# Patient Record
Sex: FEMALE | Race: WHITE | NOT HISPANIC OR LATINO | Employment: FULL TIME | URBAN - METROPOLITAN AREA
[De-identification: names, ages, dates, MRNs, and addresses within clinical notes are randomized per-mention and may not be internally consistent; named-entity substitution may affect disease eponyms.]

---

## 2023-10-30 ENCOUNTER — EVALUATION (OUTPATIENT)
Dept: PHYSICAL THERAPY | Facility: CLINIC | Age: 48
End: 2023-10-30
Payer: COMMERCIAL

## 2023-10-30 DIAGNOSIS — M54.50 CHRONIC BILATERAL LOW BACK PAIN WITHOUT SCIATICA: Primary | ICD-10-CM

## 2023-10-30 DIAGNOSIS — M54.50 LOW BACK PAIN, UNSPECIFIED: ICD-10-CM

## 2023-10-30 DIAGNOSIS — G89.29 CHRONIC BILATERAL LOW BACK PAIN WITHOUT SCIATICA: Primary | ICD-10-CM

## 2023-10-30 PROCEDURE — 97530 THERAPEUTIC ACTIVITIES: CPT | Mod: GP | Performed by: PHYSICAL THERAPIST

## 2023-10-30 PROCEDURE — 97162 PT EVAL MOD COMPLEX 30 MIN: CPT | Mod: GP | Performed by: PHYSICAL THERAPIST

## 2023-10-30 PROCEDURE — 97110 THERAPEUTIC EXERCISES: CPT | Mod: GP | Performed by: PHYSICAL THERAPIST

## 2023-10-30 ASSESSMENT — ENCOUNTER SYMPTOMS
OCCASIONAL FEELINGS OF UNSTEADINESS: 0
DEPRESSION: 0
LOSS OF SENSATION IN FEET: 0

## 2023-10-30 NOTE — PROGRESS NOTES
Physical Therapy Evaluation and Treatment      Patient Name: Shanti Gonsales  MRN: [id number]  Today's Date: 10/30/2023  Time Calculation  Start Time: 0730  Stop Time: 0809  Time Calculation (min): 39 min  PT Therapeutic Procedures Time Entry  Therapeutic Exercise Time Entry: 14  Therapeutic Activity Time Entry: 10    Current Problem:   1. Chronic bilateral low back pain without sciatica        2. Low back pain, unspecified  Referral to Physical Therapy    Follow Up In Physical Therapy          Subjective    General:  Pt reports she is having a lot of lower back pain. It starts in center and spreads out to both sides. She finds it is really bad when standing for long periods. Pain is even bothering her right hip now, trying to prop hip. No specific injury, no imaging. Been bothering her for over 6 months. Uncomfortable trying to sleep at night. When pain gets really bad during walking and standing she finds she has to lean over and stretch out her back to relieve pain. Job is difficult as she is constantly on her feet.       Precautions: None  Pain: 10/10    Objective   ROM   Lumbar: Flexion 50%, Extension 25%, Right rotation 50%, Left rotation 50%, Right side bending 50%, Left side bending 50%    Pain with extension, bilat SB    MMT   Bilateral LE strength:  Hip flex 4-/5  Knee flex 4/5  Knee ext 4/5  Pain with all right strength test    Dermatomes/Myotomes/Reflexes  Denies numbness/tingling    Palpation  TTP lumbar vertebrae and paraspinals.   TTP bilat PSIS and right glutes.     Flexibility  Bilateral hip flexor and hamstring tightness     Special Tests   Lumbar: Other: Slump: Negative       Gait   Ambulates with decreased mathew and slight forward lean posture. Bilat LE in ER with decreased knee flexion.     Stairs   Ascend/descend stairs with step-to-pattern and 1 rail    Outcome Measures:  Low Back Disability / Oswestry: 22/50    Treatments:  Therapeutic Exercise:  SKTC  PPT  LTR  SLR    Therapeutic Activity:    Educated pt on eval findings and POC  Educated pt on anatomy of spine  Advised to avoid heavy lifting/bending/twist of spine.     Assessment   Pt is a 48 y.o. female with chronic LBP suggestive of stenosis. Pt with decreased ROM, reduced strength, gait deficits, flexibility limitations and abnormal posture. Pt will benefit from skilled PT to address the above deficits for improvement in functional activities.       Moderate complexity due to patient's clinical presentation being evolving with changing characteristics, with comorbidities to include arthritis , all of which may negatively impact rehab tolerance and progression.     Plan:   Treatment/Interventions: Dry needling, Education/ Instruction, Gait training, Manual therapy, Neuromuscular re-education, Self care/ home management, Therapeutic activities, Therapeutic exercises  PT Plan: Skilled PT  PT Frequency: 1 time per week  Duration: 5 more visits  Rehab Potential: Good  Plan of Care Agreement: Patient        Goals:   Active       Mobility       Goal 1       Start:  10/30/23    Expected End:  01/28/24       Pt will improve lumbar AROM to WNL to improve I/ADLs.         Goal 2       Start:  10/30/23    Expected End:  01/28/24       Pt will improve bilat LE strength to 5/5 to improve I/ADLs.            Pain       Goal 1       Start:  10/30/23    Expected End:  01/28/24       Pt will perform all work activities with <3/10 pain.         Goal 2       Start:  10/30/23    Expected End:  01/28/24       Pt will stand/walk >30 min with <3/10 pain.

## 2023-10-30 NOTE — Clinical Note
October 30, 2023     Patient: Shanti Gonsales   YOB: 1975   Date of Visit: 10/30/2023       To Whom it May Concern:    Shanti Gonsales was seen in my clinic on 10/30/2023. She {Return to school/sport:03399}.    If you have any questions or concerns, please don't hesitate to call.         Sincerely,          Mireya Love, PT        CC: No Recipients

## 2023-10-30 NOTE — Clinical Note
October 30, 2023     Patient: Shanti Gonsales   YOB: 1975   Date of Visit: 10/30/2023       To Whom It May Concern:    It is my medical opinion that Shanti Gonsales {Work release (duty restriction):43617}.    If you have any questions or concerns, please don't hesitate to call.         Sincerely,        Mireya Love, PT    CC: No Recipients

## 2023-11-01 DIAGNOSIS — Z76.0 MEDICATION REFILL: ICD-10-CM

## 2023-11-01 RX ORDER — LEVOTHYROXINE SODIUM 100 UG/1
100 TABLET ORAL
Qty: 90 TABLET | Refills: 2 | Status: SHIPPED | OUTPATIENT
Start: 2023-11-01 | End: 2024-07-28

## 2023-11-01 RX ORDER — OMEPRAZOLE 20 MG/1
20 CAPSULE, DELAYED RELEASE ORAL DAILY
Qty: 90 CAPSULE | Refills: 2 | Status: SHIPPED | OUTPATIENT
Start: 2023-11-01 | End: 2024-07-28

## 2023-11-08 ENCOUNTER — TREATMENT (OUTPATIENT)
Dept: PHYSICAL THERAPY | Facility: CLINIC | Age: 48
End: 2023-11-08
Payer: COMMERCIAL

## 2023-11-08 DIAGNOSIS — M54.50 LOW BACK PAIN, UNSPECIFIED: ICD-10-CM

## 2023-11-08 DIAGNOSIS — G89.29 CHRONIC MIDLINE LOW BACK PAIN WITHOUT SCIATICA: Primary | ICD-10-CM

## 2023-11-08 DIAGNOSIS — M54.50 CHRONIC MIDLINE LOW BACK PAIN WITHOUT SCIATICA: Primary | ICD-10-CM

## 2023-11-08 PROCEDURE — 97110 THERAPEUTIC EXERCISES: CPT | Mod: GP | Performed by: PHYSICAL THERAPIST

## 2023-11-08 NOTE — PROGRESS NOTES
Physical Therapy Treatment    Patient Name: Shanti Gonsales  MRN: [id number]  Today's Date: 11/8/2023  Time Calculation  Start Time: 1400  Stop Time: 1439  Time Calculation (min): 39 min  PT Therapeutic Procedures Time Entry  Therapeutic Exercise Time Entry: 39  Visit # 2/6    Current Problem   1. Chronic midline low back pain without sciatica        2. Low back pain, unspecified  Follow Up In Physical Therapy          Subjective   General   Pt reports her right low back and hip is really bothering her. Able to do all exercises, but noticed popping on right with SLR. Walked at [facility] yesterday, but with pain.    Precautions: None  Pain 5/10  Post Treatment Pain Level 5/10    Objective   Bilateral anterior pelvic tilt in standing     Treatments:  NuStep L5 x6 minutes  Gastroc stretch at wedge, 30 seconds x 3  Stand hamstring stretch, 30 seconds x 3 ea R/L   Flexion stretch at step, 30 seconds x 3   Stand hip ABD, 2x10 ea R/L   Seated MIP, 2x10   LTR, 2x10  SLR, 2x10 ea R/L  Bent leg fall out, 2x10 ea R/L  Bridge 2x10   H/L hip ADD, 2x10      Assessment   Pt with good tolerance to there ex progressions without increases in pain/symptoms. Biggest relief of symptoms involves lumbar flexion based motions.       Plan: Strengthening    OP EDUCATION: Posture    Goals:   Active       Mobility       Goal 1       Start:  10/30/23    Expected End:  01/28/24       Pt will improve lumbar AROM to WNL to improve I/ADLs.         Goal 2       Start:  10/30/23    Expected End:  01/28/24       Pt will improve bilat LE strength to 5/5 to improve I/ADLs.            Pain       Goal 1       Start:  10/30/23    Expected End:  01/28/24       Pt will perform all work activities with <3/10 pain.         Goal 2       Start:  10/30/23    Expected End:  01/28/24       Pt will stand/walk >30 min with <3/10 pain.

## 2023-11-22 ENCOUNTER — APPOINTMENT (OUTPATIENT)
Dept: PHYSICAL THERAPY | Facility: CLINIC | Age: 48
End: 2023-11-22
Payer: COMMERCIAL

## 2023-11-29 ENCOUNTER — DOCUMENTATION (OUTPATIENT)
Dept: PHYSICAL THERAPY | Facility: CLINIC | Age: 48
End: 2023-11-29
Payer: COMMERCIAL

## 2023-11-29 NOTE — PROGRESS NOTES
Physical Therapy                 Therapy Communication Note    Patient Name: Shanti Gonsales  MRN: [id number]  Today's Date: 11/29/2023     Discipline: Physical Therapy    Missed Visit Reason:      Missed Time: No Show    Comment:

## 2023-12-06 ENCOUNTER — DOCUMENTATION (OUTPATIENT)
Dept: PHYSICAL THERAPY | Facility: CLINIC | Age: 48
End: 2023-12-06
Payer: COMMERCIAL

## 2023-12-06 NOTE — PROGRESS NOTES
Physical Therapy                 Therapy Communication Note    Patient Name: Shanti Gonsales  MRN: [id number]  Today's Date: 12/6/2023     Discipline: Physical Therapy    Missed Visit Reason:      Missed Time: No Show    Comment:

## 2024-01-05 ENCOUNTER — TELEPHONE (OUTPATIENT)
Dept: PRIMARY CARE | Facility: CLINIC | Age: 49
End: 2024-01-05
Payer: COMMERCIAL

## 2024-01-05 DIAGNOSIS — U07.1 COVID-19: Primary | ICD-10-CM

## 2024-01-05 NOTE — TELEPHONE ENCOUNTER
Patient called the office because she tested pos for covid 1/5 - her symptoms started 1/2. Her symptoms are congestion, fatigue, cough, abdominal pain. She is asking if paxlovid can be sent in for her to lessen her symptoms? Please advise. Call back [phone].

## 2024-01-23 ENCOUNTER — DOCUMENTATION (OUTPATIENT)
Dept: PHYSICAL THERAPY | Facility: CLINIC | Age: 49
End: 2024-01-23
Payer: COMMERCIAL

## 2024-01-23 NOTE — PROGRESS NOTES
Physical Therapy    Discharge Summary    Name: Shanti Gonsales  MRN: [id number]  : 1975  Date: 24    Discharge Summary: PT    Discharge Information: Date of last visit 2023, Date of evaluation 10/30/2023, and Number of attended visits 2    Therapy Summary: Pt only attended one follow-up visit after initial eval. She is able to complete activities at the [facility] without symptoms.    Discharge Status: Return to MD as needed.     Rehab Discharge Reason: Failed to schedule and/or keep follow-up appointment(s)

## 2024-02-09 ENCOUNTER — OFFICE VISIT (OUTPATIENT)
Dept: PRIMARY CARE | Facility: CLINIC | Age: 49
End: 2024-02-09
Payer: COMMERCIAL

## 2024-02-09 ENCOUNTER — LAB (OUTPATIENT)
Dept: LAB | Facility: LAB | Age: 49
End: 2024-02-09
Payer: COMMERCIAL

## 2024-02-09 VITALS
DIASTOLIC BLOOD PRESSURE: 80 MMHG | SYSTOLIC BLOOD PRESSURE: 128 MMHG | OXYGEN SATURATION: 98 % | BODY MASS INDEX: 42.7 KG/M2 | WEIGHT: 226 LBS | HEART RATE: 78 BPM | TEMPERATURE: 97.9 F

## 2024-02-09 DIAGNOSIS — E03.9 HYPOTHYROIDISM, UNSPECIFIED TYPE: ICD-10-CM

## 2024-02-09 DIAGNOSIS — Z12.31 BREAST CANCER SCREENING BY MAMMOGRAM: ICD-10-CM

## 2024-02-09 DIAGNOSIS — Z00.00 ADULT GENERAL MEDICAL EXAM: Primary | ICD-10-CM

## 2024-02-09 DIAGNOSIS — Z00.00 ADULT GENERAL MEDICAL EXAM: ICD-10-CM

## 2024-02-09 DIAGNOSIS — R31.29 MICROSCOPIC HEMATURIA: Primary | ICD-10-CM

## 2024-02-09 DIAGNOSIS — Z12.11 COLON CANCER SCREENING: ICD-10-CM

## 2024-02-09 LAB
ALBUMIN SERPL-MCNC: 4.3 G/DL (ref 3.5–5)
ALP BLD-CCNC: 86 U/L (ref 35–125)
ALT SERPL-CCNC: 19 U/L (ref 5–40)
ANION GAP SERPL CALC-SCNC: 12 MMOL/L
APPEARANCE UR: ABNORMAL
AST SERPL-CCNC: 17 U/L (ref 5–40)
BACTERIA #/AREA URNS AUTO: ABNORMAL /HPF
BILIRUB SERPL-MCNC: 0.4 MG/DL (ref 0.1–1.2)
BILIRUB UR STRIP.AUTO-MCNC: NEGATIVE MG/DL
BUN SERPL-MCNC: 20 MG/DL (ref 8–25)
CALCIUM SERPL-MCNC: 9.5 MG/DL (ref 8.5–10.4)
CHLORIDE SERPL-SCNC: 102 MMOL/L (ref 97–107)
CHOLEST SERPL-MCNC: 218 MG/DL (ref 133–200)
CHOLEST/HDLC SERPL: 5.6 {RATIO}
CO2 SERPL-SCNC: 26 MMOL/L (ref 24–31)
COLOR UR: ABNORMAL
CREAT SERPL-MCNC: 0.9 MG/DL (ref 0.4–1.6)
EGFRCR SERPLBLD CKD-EPI 2021: 78 ML/MIN/1.73M*2
ERYTHROCYTE [DISTWIDTH] IN BLOOD BY AUTOMATED COUNT: 14.3 % (ref 11.5–14.5)
EST. AVERAGE GLUCOSE BLD GHB EST-MCNC: 137 MG/DL
GLUCOSE SERPL-MCNC: 99 MG/DL (ref 65–99)
GLUCOSE UR STRIP.AUTO-MCNC: NORMAL MG/DL
HBA1C MFR BLD: 6.4 %
HCT VFR BLD AUTO: 41.6 % (ref 36–46)
HDLC SERPL-MCNC: 39 MG/DL
HGB BLD-MCNC: 13.3 G/DL (ref 12–16)
HOLD SPECIMEN: NORMAL
KETONES UR STRIP.AUTO-MCNC: NEGATIVE MG/DL
LDLC SERPL CALC-MCNC: 120 MG/DL (ref 65–130)
LEUKOCYTE ESTERASE UR QL STRIP.AUTO: ABNORMAL
MCH RBC QN AUTO: 26.2 PG (ref 26–34)
MCHC RBC AUTO-ENTMCNC: 32 G/DL (ref 32–36)
MCV RBC AUTO: 82 FL (ref 80–100)
MUCOUS THREADS #/AREA URNS AUTO: ABNORMAL /LPF
NITRITE UR QL STRIP.AUTO: NEGATIVE
NRBC BLD-RTO: 0 /100 WBCS (ref 0–0)
PH UR STRIP.AUTO: 5 [PH]
PLATELET # BLD AUTO: 335 X10*3/UL (ref 150–450)
POTASSIUM SERPL-SCNC: 4.7 MMOL/L (ref 3.4–5.1)
PROT SERPL-MCNC: 7.2 G/DL (ref 5.9–7.9)
PROT UR STRIP.AUTO-MCNC: ABNORMAL MG/DL
RBC # BLD AUTO: 5.08 X10*6/UL (ref 4–5.2)
RBC # UR STRIP.AUTO: ABNORMAL /UL
RBC #/AREA URNS AUTO: ABNORMAL /HPF
SODIUM SERPL-SCNC: 140 MMOL/L (ref 133–145)
SP GR UR STRIP.AUTO: 1.02
SQUAMOUS #/AREA URNS AUTO: ABNORMAL /HPF
TRANS CELLS #/AREA UR COMP ASSIST: ABNORMAL /HPF
TRIGL SERPL-MCNC: 293 MG/DL (ref 40–150)
TSH SERPL DL<=0.05 MIU/L-ACNC: 2.63 MIU/L (ref 0.27–4.2)
UROBILINOGEN UR STRIP.AUTO-MCNC: NORMAL MG/DL
WBC # BLD AUTO: 10.5 X10*3/UL (ref 4.4–11.3)
WBC #/AREA URNS AUTO: ABNORMAL /HPF

## 2024-02-09 PROCEDURE — 85027 COMPLETE CBC AUTOMATED: CPT

## 2024-02-09 PROCEDURE — 83036 HEMOGLOBIN GLYCOSYLATED A1C: CPT

## 2024-02-09 PROCEDURE — 90750 HZV VACC RECOMBINANT IM: CPT | Performed by: INTERNAL MEDICINE

## 2024-02-09 PROCEDURE — 80053 COMPREHEN METABOLIC PANEL: CPT

## 2024-02-09 PROCEDURE — 1036F TOBACCO NON-USER: CPT | Performed by: INTERNAL MEDICINE

## 2024-02-09 PROCEDURE — 84443 ASSAY THYROID STIM HORMONE: CPT

## 2024-02-09 PROCEDURE — 81001 URINALYSIS AUTO W/SCOPE: CPT

## 2024-02-09 PROCEDURE — 99396 PREV VISIT EST AGE 40-64: CPT | Performed by: INTERNAL MEDICINE

## 2024-02-09 PROCEDURE — 36415 COLL VENOUS BLD VENIPUNCTURE: CPT

## 2024-02-09 PROCEDURE — 87086 URINE CULTURE/COLONY COUNT: CPT

## 2024-02-09 PROCEDURE — 80061 LIPID PANEL: CPT

## 2024-02-09 RX ORDER — METOPROLOL TARTRATE 25 MG/1
12.5 TABLET, FILM COATED ORAL 2 TIMES DAILY
COMMUNITY
End: 2024-03-11

## 2024-02-09 RX ORDER — SERTRALINE HYDROCHLORIDE 50 MG/1
50 TABLET, FILM COATED ORAL
COMMUNITY
Start: 2019-08-01

## 2024-02-09 ASSESSMENT — PAIN SCALES - GENERAL: PAINLEVEL: 0-NO PAIN

## 2024-02-09 NOTE — PROGRESS NOTES
Subjective   Patient ID: Shanti Gonsales is a 48 y.o. female who presents for Annual Exam.    HPI  1.  Physical exam.  Pap: last done, scheduled with GYN, Dr. Chaidez  Mammogram: last done, overdue   Colonoscopy: last done, 2014       Review of Systems  All pertinent positive symptoms are included in the history of present illness.    All other systems have been reviewed and are negative and noncontributory to this patient's current ailments.    Past Medical History:   Diagnosis Date    Disorder of thyroid, unspecified     Thyroid trouble    Other conditions influencing health status     Right handed    Personal history of other diseases of the circulatory system     History of hypertension    Personal history of other specified conditions     History of shortness of breath    Unspecified disorder of nose and nasal sinuses     Sinus trouble     History reviewed. No pertinent surgical history.  Social History     Tobacco Use    Smoking status: Never    Smokeless tobacco: Never   Substance Use Topics    Alcohol use: Never    Drug use: Never     No family history on file.  No Known Allergies  Immunization History   Administered Date(s) Administered    Moderna SARS-CoV-2 Vaccination 01/08/2021, 02/04/2021    Zoster vaccine, recombinant, adult (SHINGRIX) 02/09/2024     Current Outpatient Medications   Medication Instructions    levothyroxine (SYNTHROID, LEVOXYL) 100 mcg, oral, Daily before breakfast    metoprolol tartrate (LOPRESSOR) 12.5 mg, oral, 2 times daily    omeprazole (PRILOSEC) 20 mg, oral, Daily    sertraline (ZOLOFT) 50 mg, oral     Objective   Visit Vitals  /80   Pulse 78   Temp 36.6 °C (97.9 °F)   Wt 103 kg (226 lb)   SpO2 98%   BMI 42.70 kg/m²   Smoking Status Never   BSA 2.11 m²     Lab on 02/09/2024   Component Date Value    WBC 02/09/2024 10.5     nRBC 02/09/2024 0.0     RBC 02/09/2024 5.08     Hemoglobin 02/09/2024 13.3     Hematocrit 02/09/2024 41.6     MCV 02/09/2024 82     MCH 02/09/2024 26.2      MCHC 02/09/2024 32.0     RDW 02/09/2024 14.3     Platelets 02/09/2024 335     Hemoglobin A1C 02/09/2024 6.4 (H)     Estimated Average Glucose 02/09/2024 137     Color, Urine 02/09/2024 Light-Yellow     Appearance, Urine 02/09/2024 Turbid (N)     Specific Gravity, Urine 02/09/2024 1.024     pH, Urine 02/09/2024 5.0     Protein, Urine 02/09/2024 10 (TRACE)     Glucose, Urine 02/09/2024 Normal     Blood, Urine 02/09/2024 0.03 (TRACE) (A)     Ketones, Urine 02/09/2024 NEGATIVE     Bilirubin, Urine 02/09/2024 NEGATIVE     Urobilinogen, Urine 02/09/2024 Normal     Nitrite, Urine 02/09/2024 NEGATIVE     Leukocyte Esterase, Urine 02/09/2024 500 Christopher/µL (A)     WBC, Urine 02/09/2024 21-50 (A)     RBC, Urine 02/09/2024 11-20 (A)     Squamous Epithelial Cell* 02/09/2024 10-25 (FEW)     Transitional Epithelial * 02/09/2024 3-5 (1+)     Bacteria, Urine 02/09/2024 2+ (A)     Mucus, Urine 02/09/2024 FEW      Physical Exam  CONSTITUTIONAL - well nourished, well developed, looks like stated age, in no acute distress, not ill-appearing, and not tired appearing  SKIN - normal skin color and pigmentation, normal skin turgor without rash, lesions, or nodules visualized  HEAD - no trauma, normocephalic  EYES - pupils are equal and reactive to light, extraocular muscles are intact, and normal external exam  ENT - TM's intact, no injection, no signs of infection, uvula midline, normal tongue movement and throat normal, no exudate, nasal passage without discharge and patent  NECK - supple without rigidity, no neck mass was observed, no thyromegaly or thyroid nodules  CHEST - clear to auscultation, no wheezing, no crackles and no rales, good effort  CARDIAC - regular rate and regular rhythm, no skipped beats, no murmur  ABDOMEN - no organomegaly, soft, nontender, nondistended, normal bowel sounds, no guarding/rebound/rigidity, negative McBurney sign and negative Balbuena sign  EXTREMITIES - no edema, no deformities  NEUROLOGICAL - normal  gait, normal balance, normal motor, no ataxia; alert, oriented and no focal signs  PSYCHIATRIC - alert, pleasant and cordial, age-appropriate  IMMUNOLOGIC - no cervical lymphadenopathy    Assessment/Plan   Problem List Items Addressed This Visit    None  Visit Diagnoses       Adult general medical exam    -  Primary    Relevant Orders    Lipid Panel    Comprehensive Metabolic Panel    CBC (Completed)    Urinalysis with Reflex Culture and Microscopic    Hemoglobin A1C (Completed)    Hypothyroidism, unspecified type        Relevant Orders    TSH with reflex to Free T4 if abnormal    Breast cancer screening by mammogram        Relevant Orders    BI mammo bilateral screening tomosynthesis    Colon cancer screening        Relevant Orders    Referral to Gastroenterology         Healthy diet and exercise encouraged. Low fat, low salt diet. Drink plenty of fluids. Exercise at least 5 times/week for at least 45 minutes per day.

## 2024-02-10 LAB — BACTERIA UR CULT: NO GROWTH

## 2024-02-12 ENCOUNTER — E-VISIT (OUTPATIENT)
Dept: PRIMARY CARE | Facility: CLINIC | Age: 49
End: 2024-02-12
Payer: COMMERCIAL

## 2024-02-12 DIAGNOSIS — E78.1 HYPERTRIGLYCERIDEMIA: Primary | ICD-10-CM

## 2024-02-12 RX ORDER — GEMFIBROZIL 600 MG/1
600 TABLET, FILM COATED ORAL 2 TIMES DAILY
Qty: 180 TABLET | Refills: 3 | Status: SHIPPED | OUTPATIENT
Start: 2024-02-12 | End: 2025-02-06

## 2024-02-12 NOTE — TELEPHONE ENCOUNTER
My apologies, I was awaiting an ok from Friday's message to send the rx. Rx sent. Repeat labs in 6 weeks.

## 2024-02-21 ENCOUNTER — APPOINTMENT (OUTPATIENT)
Dept: RADIOLOGY | Facility: HOSPITAL | Age: 49
End: 2024-02-21
Payer: COMMERCIAL

## 2024-02-21 ENCOUNTER — APPOINTMENT (OUTPATIENT)
Dept: CARDIOLOGY | Facility: HOSPITAL | Age: 49
End: 2024-02-21
Payer: COMMERCIAL

## 2024-02-21 ENCOUNTER — HOSPITAL ENCOUNTER (EMERGENCY)
Facility: HOSPITAL | Age: 49
Discharge: HOME | End: 2024-02-21
Attending: STUDENT IN AN ORGANIZED HEALTH CARE EDUCATION/TRAINING PROGRAM
Payer: COMMERCIAL

## 2024-02-21 VITALS
HEART RATE: 68 BPM | WEIGHT: 222 LBS | OXYGEN SATURATION: 97 % | BODY MASS INDEX: 41.91 KG/M2 | DIASTOLIC BLOOD PRESSURE: 75 MMHG | HEIGHT: 61 IN | SYSTOLIC BLOOD PRESSURE: 130 MMHG | RESPIRATION RATE: 16 BRPM

## 2024-02-21 DIAGNOSIS — R42 DIZZINESS: ICD-10-CM

## 2024-02-21 DIAGNOSIS — R42 VERTIGO: Primary | ICD-10-CM

## 2024-02-21 DIAGNOSIS — N30.90 CYSTITIS: ICD-10-CM

## 2024-02-21 LAB
ALBUMIN SERPL-MCNC: 4.6 G/DL (ref 3.5–5)
ALP BLD-CCNC: 76 U/L (ref 35–125)
ALT SERPL-CCNC: 16 U/L (ref 5–40)
ANION GAP SERPL CALC-SCNC: 13 MMOL/L
APPEARANCE UR: ABNORMAL
AST SERPL-CCNC: 16 U/L (ref 5–40)
BACTERIA #/AREA URNS AUTO: ABNORMAL /HPF
BASOPHILS # BLD AUTO: 0.06 X10*3/UL (ref 0–0.1)
BASOPHILS NFR BLD AUTO: 0.7 %
BILIRUB SERPL-MCNC: 0.2 MG/DL (ref 0.1–1.2)
BILIRUB UR STRIP.AUTO-MCNC: NEGATIVE MG/DL
BUN SERPL-MCNC: 26 MG/DL (ref 8–25)
CALCIUM SERPL-MCNC: 9.7 MG/DL (ref 8.5–10.4)
CHLORIDE SERPL-SCNC: 99 MMOL/L (ref 97–107)
CO2 SERPL-SCNC: 25 MMOL/L (ref 24–31)
COLOR UR: ABNORMAL
CREAT SERPL-MCNC: 1.3 MG/DL (ref 0.4–1.6)
EGFRCR SERPLBLD CKD-EPI 2021: 50 ML/MIN/1.73M*2
EOSINOPHIL # BLD AUTO: 0.01 X10*3/UL (ref 0–0.7)
EOSINOPHIL NFR BLD AUTO: 0.1 %
ERYTHROCYTE [DISTWIDTH] IN BLOOD BY AUTOMATED COUNT: 14.2 % (ref 11.5–14.5)
FLUAV RNA RESP QL NAA+PROBE: NOT DETECTED
FLUBV RNA RESP QL NAA+PROBE: NOT DETECTED
GLUCOSE SERPL-MCNC: 125 MG/DL (ref 65–99)
GLUCOSE UR STRIP.AUTO-MCNC: NORMAL MG/DL
HCT VFR BLD AUTO: 42 % (ref 36–46)
HGB BLD-MCNC: 13.5 G/DL (ref 12–16)
IMM GRANULOCYTES # BLD AUTO: 0.1 X10*3/UL (ref 0–0.7)
IMM GRANULOCYTES NFR BLD AUTO: 1.1 % (ref 0–0.9)
KETONES UR STRIP.AUTO-MCNC: NEGATIVE MG/DL
LEUKOCYTE ESTERASE UR QL STRIP.AUTO: ABNORMAL
LYMPHOCYTES # BLD AUTO: 3.03 X10*3/UL (ref 1.2–4.8)
LYMPHOCYTES NFR BLD AUTO: 34.3 %
MAGNESIUM SERPL-MCNC: 2.2 MG/DL (ref 1.6–3.1)
MCH RBC QN AUTO: 26.3 PG (ref 26–34)
MCHC RBC AUTO-ENTMCNC: 32.1 G/DL (ref 32–36)
MCV RBC AUTO: 82 FL (ref 80–100)
MONOCYTES # BLD AUTO: 0.54 X10*3/UL (ref 0.1–1)
MONOCYTES NFR BLD AUTO: 6.1 %
MUCOUS THREADS #/AREA URNS AUTO: ABNORMAL /LPF
NEUTROPHILS # BLD AUTO: 5.09 X10*3/UL (ref 1.2–7.7)
NEUTROPHILS NFR BLD AUTO: 57.7 %
NITRITE UR QL STRIP.AUTO: NEGATIVE
NRBC BLD-RTO: 0 /100 WBCS (ref 0–0)
PH UR STRIP.AUTO: 5.5 [PH]
PLATELET # BLD AUTO: 334 X10*3/UL (ref 150–450)
POTASSIUM SERPL-SCNC: 4 MMOL/L (ref 3.4–5.1)
PROT SERPL-MCNC: 8.3 G/DL (ref 5.9–7.9)
PROT UR STRIP.AUTO-MCNC: ABNORMAL MG/DL
RBC # BLD AUTO: 5.13 X10*6/UL (ref 4–5.2)
RBC # UR STRIP.AUTO: ABNORMAL /UL
RBC #/AREA URNS AUTO: ABNORMAL /HPF
RSV RNA RESP QL NAA+PROBE: NOT DETECTED
SARS-COV-2 RNA RESP QL NAA+PROBE: NOT DETECTED
SODIUM SERPL-SCNC: 137 MMOL/L (ref 133–145)
SP GR UR STRIP.AUTO: 1.02
SQUAMOUS #/AREA URNS AUTO: ABNORMAL /HPF
TROPONIN T SERPL-MCNC: 8 NG/L
UROBILINOGEN UR STRIP.AUTO-MCNC: NORMAL MG/DL
WBC # BLD AUTO: 8.8 X10*3/UL (ref 4.4–11.3)
WBC #/AREA URNS AUTO: >50 /HPF

## 2024-02-21 PROCEDURE — 93005 ELECTROCARDIOGRAM TRACING: CPT

## 2024-02-21 PROCEDURE — 96361 HYDRATE IV INFUSION ADD-ON: CPT

## 2024-02-21 PROCEDURE — 71045 X-RAY EXAM CHEST 1 VIEW: CPT

## 2024-02-21 PROCEDURE — 96375 TX/PRO/DX INJ NEW DRUG ADDON: CPT

## 2024-02-21 PROCEDURE — 87086 URINE CULTURE/COLONY COUNT: CPT | Mod: WESLAB

## 2024-02-21 PROCEDURE — 83735 ASSAY OF MAGNESIUM: CPT

## 2024-02-21 PROCEDURE — 84484 ASSAY OF TROPONIN QUANT: CPT

## 2024-02-21 PROCEDURE — 96365 THER/PROPH/DIAG IV INF INIT: CPT

## 2024-02-21 PROCEDURE — 80053 COMPREHEN METABOLIC PANEL: CPT

## 2024-02-21 PROCEDURE — 87637 SARSCOV2&INF A&B&RSV AMP PRB: CPT

## 2024-02-21 PROCEDURE — 99284 EMERGENCY DEPT VISIT MOD MDM: CPT | Mod: 25

## 2024-02-21 PROCEDURE — 2500000001 HC RX 250 WO HCPCS SELF ADMINISTERED DRUGS (ALT 637 FOR MEDICARE OP)

## 2024-02-21 PROCEDURE — 81001 URINALYSIS AUTO W/SCOPE: CPT

## 2024-02-21 PROCEDURE — 85025 COMPLETE CBC W/AUTO DIFF WBC: CPT

## 2024-02-21 PROCEDURE — 36415 COLL VENOUS BLD VENIPUNCTURE: CPT

## 2024-02-21 PROCEDURE — 2500000004 HC RX 250 GENERAL PHARMACY W/ HCPCS (ALT 636 FOR OP/ED)

## 2024-02-21 PROCEDURE — 2500000004 HC RX 250 GENERAL PHARMACY W/ HCPCS (ALT 636 FOR OP/ED): Performed by: STUDENT IN AN ORGANIZED HEALTH CARE EDUCATION/TRAINING PROGRAM

## 2024-02-21 RX ORDER — CEPHALEXIN 500 MG/1
500 CAPSULE ORAL 2 TIMES DAILY
Qty: 14 CAPSULE | Refills: 0 | Status: SHIPPED | OUTPATIENT
Start: 2024-02-21 | End: 2024-02-28

## 2024-02-21 RX ORDER — ONDANSETRON HYDROCHLORIDE 2 MG/ML
4 INJECTION, SOLUTION INTRAVENOUS ONCE
Status: COMPLETED | OUTPATIENT
Start: 2024-02-21 | End: 2024-02-21

## 2024-02-21 RX ORDER — METOCLOPRAMIDE 10 MG/1
10 TABLET ORAL EVERY 6 HOURS PRN
Qty: 28 TABLET | Refills: 0 | Status: SHIPPED | OUTPATIENT
Start: 2024-02-21 | End: 2024-03-15 | Stop reason: ALTCHOICE

## 2024-02-21 RX ORDER — MECLIZINE HYDROCHLORIDE 25 MG/1
25 TABLET ORAL 3 TIMES DAILY PRN
Qty: 30 TABLET | Refills: 0 | Status: SHIPPED | OUTPATIENT
Start: 2024-02-21 | End: 2024-03-02

## 2024-02-21 RX ORDER — CEFTRIAXONE 1 G/50ML
1 INJECTION, SOLUTION INTRAVENOUS ONCE
Status: COMPLETED | OUTPATIENT
Start: 2024-02-21 | End: 2024-02-21

## 2024-02-21 RX ORDER — MECLIZINE HYDROCHLORIDE 25 MG/1
25 TABLET ORAL ONCE
Status: COMPLETED | OUTPATIENT
Start: 2024-02-21 | End: 2024-02-21

## 2024-02-21 RX ORDER — FAMOTIDINE 10 MG/ML
20 INJECTION INTRAVENOUS ONCE
Status: COMPLETED | OUTPATIENT
Start: 2024-02-21 | End: 2024-02-21

## 2024-02-21 RX ORDER — METOCLOPRAMIDE HYDROCHLORIDE 5 MG/ML
10 INJECTION INTRAMUSCULAR; INTRAVENOUS ONCE
Status: COMPLETED | OUTPATIENT
Start: 2024-02-21 | End: 2024-02-21

## 2024-02-21 RX ADMIN — CEFTRIAXONE SODIUM 1 G: 1 INJECTION, SOLUTION INTRAVENOUS at 19:36

## 2024-02-21 RX ADMIN — MECLIZINE HYDROCHLORIDE 25 MG: 25 TABLET ORAL at 16:57

## 2024-02-21 RX ADMIN — MECLIZINE HYDROCHLORIDE 25 MG: 25 TABLET ORAL at 18:28

## 2024-02-21 RX ADMIN — METOCLOPRAMIDE 10 MG: 5 INJECTION, SOLUTION INTRAMUSCULAR; INTRAVENOUS at 19:36

## 2024-02-21 RX ADMIN — ONDANSETRON 4 MG: 2 INJECTION INTRAMUSCULAR; INTRAVENOUS at 16:58

## 2024-02-21 RX ADMIN — FAMOTIDINE 20 MG: 10 INJECTION, SOLUTION INTRAVENOUS at 16:58

## 2024-02-21 RX ADMIN — SODIUM CHLORIDE 1000 ML: 900 INJECTION, SOLUTION INTRAVENOUS at 16:59

## 2024-02-21 ASSESSMENT — PAIN - FUNCTIONAL ASSESSMENT: PAIN_FUNCTIONAL_ASSESSMENT: 0-10

## 2024-02-21 ASSESSMENT — LIFESTYLE VARIABLES
HAVE YOU EVER FELT YOU SHOULD CUT DOWN ON YOUR DRINKING: NO
EVER FELT BAD OR GUILTY ABOUT YOUR DRINKING: NO
HAVE PEOPLE ANNOYED YOU BY CRITICIZING YOUR DRINKING: NO
EVER HAD A DRINK FIRST THING IN THE MORNING TO STEADY YOUR NERVES TO GET RID OF A HANGOVER: NO

## 2024-02-21 ASSESSMENT — PAIN SCALES - GENERAL
PAINLEVEL_OUTOF10: 0 - NO PAIN
PAINLEVEL_OUTOF10: 0 - NO PAIN

## 2024-02-21 ASSESSMENT — COLUMBIA-SUICIDE SEVERITY RATING SCALE - C-SSRS
1. IN THE PAST MONTH, HAVE YOU WISHED YOU WERE DEAD OR WISHED YOU COULD GO TO SLEEP AND NOT WAKE UP?: NO
2. HAVE YOU ACTUALLY HAD ANY THOUGHTS OF KILLING YOURSELF?: NO
6. HAVE YOU EVER DONE ANYTHING, STARTED TO DO ANYTHING, OR PREPARED TO DO ANYTHING TO END YOUR LIFE?: NO

## 2024-02-21 NOTE — ED NOTES
PATIENT REPORTS DIZZINESS AND IS NAUSEATED SINCE 1530, NO FEVER, CHILLS, CP OR SOB    PMHX:  Past Medical History:   Diagnosis Date    Disorder of thyroid, unspecified     Thyroid trouble    Other conditions influencing health status     Right handed    Personal history of other diseases of the circulatory system     History of hypertension    Personal history of other specified conditions     History of shortness of breath    Unspecified disorder of nose and nasal sinuses     Sinus trouble        No Known Allergies      LABS:   Latest Reference Range & Units 02/21/24 16:45   Flu A Result Not Detected  Not Detected   Flu B Result Not Detected  Not Detected   RSV PCR Not Detected  Not Detected   Coronavirus 2019, PCR Not Detected  Not Detected      Latest Reference Range & Units 02/21/24 16:45   Troponin T, High Sensitivity <=14 ng/L 8      Latest Reference Range & Units 02/21/24 16:45   GLUCOSE 65 - 99 mg/dL 125 (H)   SODIUM 133 - 145 mmol/L 137   POTASSIUM 3.4 - 5.1 mmol/L 4.0   CHLORIDE 97 - 107 mmol/L 99   Bicarbonate 24 - 31 mmol/L 25   Anion Gap <=19 mmol/L 13   Blood Urea Nitrogen 8 - 25 mg/dL 26 (H)   Creatinine 0.40 - 1.60 mg/dL 1.30   EGFR >60 mL/min/1.73m*2 50 (L)   Calcium 8.5 - 10.4 mg/dL 9.7   Albumin 3.5 - 5.0 g/dL 4.6   Alkaline Phosphatase 35 - 125 U/L 76   ALT 5 - 40 U/L 16   AST 5 - 40 U/L 16   Bilirubin Total 0.1 - 1.2 mg/dL 0.2   Total Protein 5.9 - 7.9 g/dL 8.3 (H)   MAGNESIUM 1.60 - 3.10 mg/dL 2.20   WBC 4.4 - 11.3 x10*3/uL 8.8   nRBC 0.0 - 0.0 /100 WBCs 0.0   RBC 4.00 - 5.20 x10*6/uL 5.13   HEMOGLOBIN 12.0 - 16.0 g/dL 13.5   HEMATOCRIT 36.0 - 46.0 % 42.0   MCV 80 - 100 fL 82   MCH 26.0 - 34.0 pg 26.3   MCHC 32.0 - 36.0 g/dL 32.1   RED CELL DISTRIBUTION WIDTH 11.5 - 14.5 % 14.2   Platelets 150 - 450 x10*3/uL 334   (H): Data is abnormally high  (L): Data is abnormally low      PLAN: PENDING                   Fabi Collins RN  02/21/24 0605

## 2024-02-21 NOTE — ED PROVIDER NOTES
HPI   Chief Complaint   Patient presents with    Dizziness     AND NAUSEATED SINCE 1530, NO FEVER, CHILLS, CP OR SOB       Patient is a 48-year-old female presenting with dizziness, nausea and vomiting.  She states that she was at her dental appointment when she got her car and began to feel very dizzy.  She describes it as the room spinning and feeling unsteady on her feet.  She was able to walk to the building, and braced herself.  She said that the dizziness did subside.  She walked in for her appointment, and the dizziness began again.  She says that it hit very hard and she asked a worker to call EMS.  She says that she has had this in the past roughly 1 year ago and was diagnosed with vertigo.  Says that the dizziness has not resolved since being at the dental office and she cannot open her eyes because of the dizziness.  Patient actively vomiting upon examination.  Patient endorses chills but denies documented fevers.                           [place] Coma Scale Score: 15                     Patient History   Past Medical History:   Diagnosis Date    Disorder of thyroid, unspecified     Thyroid trouble    Other conditions influencing health status     Right handed    Personal history of other diseases of the circulatory system     History of hypertension    Personal history of other specified conditions     History of shortness of breath    Unspecified disorder of nose and nasal sinuses     Sinus trouble     History reviewed. No pertinent surgical history.  No family history on file.  Social History     Tobacco Use    Smoking status: Never    Smokeless tobacco: Never   Substance Use Topics    Alcohol use: Never    Drug use: Never       Physical Exam   ED Triage Vitals   Temp Pulse Resp BP   -- -- -- --      SpO2 Temp src Heart Rate Source Patient Position   -- -- -- --      BP Location FiO2 (%)     -- --       Physical Exam  Constitutional:       Comments: Awake, uncomfortable appearing, with eyes closed,  laying in examination bed   HENT:      Head: Normocephalic and atraumatic.      Nose: Nose normal.      Mouth/Throat:      Mouth: Mucous membranes are moist.      Pharynx: Oropharynx is clear.   Eyes:      Extraocular Movements: Extraocular movements intact.      Pupils: Pupils are equal, round, and reactive to light.   Cardiovascular:      Rate and Rhythm: Normal rate and regular rhythm.      Pulses: Normal pulses.      Heart sounds: Normal heart sounds.   Pulmonary:      Effort: Pulmonary effort is normal.      Breath sounds: Normal breath sounds.   Abdominal:      General: Abdomen is flat.      Palpations: Abdomen is soft.   Musculoskeletal:         General: Normal range of motion.      Cervical back: Normal range of motion and neck supple.   Skin:     General: Skin is warm and dry.      Capillary Refill: Capillary refill takes less than 2 seconds.   Neurological:      General: No focal deficit present.      Mental Status: She is alert and oriented to person, place, and time.   Psychiatric:         Mood and Affect: Mood normal.         Behavior: Behavior normal.         ED Course & MDM   ED Course as of 02/21/24 2044 Wed Feb 21, 2024   1710 EKG presented to me at 5:11 PM     EKG as interpreted by me shows sinus rhythm at a rate of 68 bpm, T wave inversions in inferior leads as well as anterior lateral leads, no STEMI. [DH]   1856 Attending MDM:  48-year-old female no significant past medical history presents emergency room with dizziness.  Patient describes room spinning sensation and inability to keep her eyes open or walk.  Patient was not at home and she almost fell but caught herself.  Patient notes history of this issue in the past but it was many years ago.  Patient otherwise denies any head strike or loss of consciousness and otherwise notes several episodes of nonbloody nonbilious emesis.  Patient denies any abdominal pain, fevers, chills, chest pain, shortness of breath, diarrhea,  constipation.    Vital signs as interpreted by me: Afebrile, nontachycardic, normotensive, 97% on room air    Exam:    Constitutional: Uncomfortable appearing, sitting with eyes closed.    Head: Normocephalic, atraumatic.   Eyes: Pupils equal bilaterally, EOM grossly intact, conjunctiva normal.  Unidirectional nystagmus appreciated.  Mouth/Throat: Oropharynx is clear, moist mucus membranes.   Neck: Supple. No lymphadenopathy.  Cardiovascular: Regular rate and regular rhythm. Extremities are well-perfused.    Pulmonary/Chest: No respiratory distress, breathing comfortably on room air.    Abdominal: Soft, non-tender, non-distended. No rebound or guarding.   Musculoskeletal: No lower extremity edema.       Skin: Warm, dry, and intact.   Neurological: No nerves II through XII grossly intact, strength 5 out of 5 in the upper and lower extremities, no dysdiadochokinesia.  Patient is oriented to person, place, time, and situation. Face symmetric, hearing intact to voice, speech normal. Moves all extremities.   Psych: Mood, affect, thought content, and judgment normal.    Differential includes but not limited to:  Central versus peripheral vertigo [DH]   1858 Patient presentation at this time is most consistent with peripheral cause of vertigo.  Patient notes that she has longstanding tinnitus and otherwise does have an ENT doctor but has not followed up regarding her dizziness has not bothered her in many years.  Patient notes some improvement after meclizine administration but continues to have room spinning sensation and will now administer second dose.    Lab work as interpreted by me shows no significant leukocytosis or anemia on CBC and otherwise CMP within normal limits.  Patient does have slight elevation in creatinine at this time but still within normal limits as patient otherwise does note some history of recent flulike illness.  Recent flulike illness likely precipitating some degree of vestibular neuropathy at  this time and causing and contributing to her dizziness. [DH]      ED Course User Index  [DH] Brendan Domingo MD         Diagnoses as of 02/21/24 2044   Vertigo   Dizziness   Cystitis       Medical Decision Making  Patient is a 48-year-old female with past medical history of HTN, HLD presenting with dizziness.  Basic lab work, troponin, EKG, UA, viral swabs, chest x-ray ordered.  IV fluids, Zofran, Pepcid, meclizine ordered.  Conditions considered include but are not limited to: Vertigo, ACS, electrolyte abnormality, UTI, central vertigo, peripheral vertigo.  Patient presentation is more classic for peripheral vertigo.    CBC is without leukocytosis or anemia.  CMP without significant electrolyte abnormality.  Initial troponin within normal limits.  Magnesium within normal notes.  Viral swabs are negative.  Chest x-ray shows no acute cardiopulmonary process.  UA with micro does show a UTI.    Patient says the nausea has subsided.  After 1 meclizine, patient said her symptoms began again to improve and she was still feeling dizzy.  Was able to keep her eyes open, and did appear more comfortable than upon initial evaluation.  Meclizine and Reglan ordered.  Patient states she is feeling significantly improved.  Trial of ambulation was successful.  Patient able to ambulate around her room several times with steady gait.  She was requesting to go home.  I believe this patient is at low risk for complication, and a disoposition of discharge is acceptable.  Return to the Emergency Department if new or worsening symptoms including headache, fever, chills, chest pain, shortness of breath, syncope, near syncope, abdominal pain, nausea, vomiting,  diarrhea, or worsening pain.  Discussed with patient this is likely vertigo and may be exacerbated by her newly found UTI as well as recent flulike illness.  Prescription for meclizine, Reglan, Keflex will be sent.  Recommended patient take antibiotics until completion.  Discussed taking  meclizine and Reglan as needed for dizziness.  Also discussed follow-up with primary care.  Patient is agreeable to disposition of discharge with follow-up with primary care and to take antibiotics to completion.    Portions of this note made with Dragon software, please be mindful of potential grammatical errors.        Medications   ondansetron (Zofran) injection 4 mg (4 mg intravenous Given 2/21/24 1658)   famotidine PF (Pepcid) injection 20 mg (20 mg intravenous Given 2/21/24 1658)   sodium chloride 0.9 % bolus 1,000 mL (0 mL intravenous Stopped 2/21/24 1759)   meclizine (Antivert) tablet 25 mg (25 mg oral Given 2/21/24 1657)   meclizine (Antivert) tablet 25 mg (25 mg oral Given 2/21/24 1828)   cefTRIAXone (Rocephin) IVPB 1 g (1 g intravenous New Bag 2/21/24 1936)   metoclopramide (Reglan) injection 10 mg (10 mg intravenous Given 2/21/24 1936)         Labs Reviewed   CBC WITH AUTO DIFFERENTIAL - Abnormal       Result Value    WBC 8.8      nRBC 0.0      RBC 5.13      Hemoglobin 13.5      Hematocrit 42.0      MCV 82      MCH 26.3      MCHC 32.1      RDW 14.2      Platelets 334      Neutrophils % 57.7      Immature Granulocytes %, Automated 1.1 (*)     Lymphocytes % 34.3      Monocytes % 6.1      Eosinophils % 0.1      Basophils % 0.7      Neutrophils Absolute 5.09      Immature Granulocytes Absolute, Automated 0.10      Lymphocytes Absolute 3.03      Monocytes Absolute 0.54      Eosinophils Absolute 0.01      Basophils Absolute 0.06     COMPREHENSIVE METABOLIC PANEL - Abnormal    Glucose 125 (*)     Sodium 137      Potassium 4.0      Chloride 99      Bicarbonate 25      Urea Nitrogen 26 (*)     Creatinine 1.30      eGFR 50 (*)     Calcium 9.7      Albumin 4.6      Alkaline Phosphatase 76      Total Protein 8.3 (*)     AST 16      Bilirubin, Total 0.2      ALT 16      Anion Gap 13     URINALYSIS WITH REFLEX CULTURE AND MICROSCOPIC - Abnormal    Color, Urine Light-Yellow      Appearance, Urine Turbid (*)      Specific Gravity, Urine 1.023      pH, Urine 5.5      Protein, Urine 10 (TRACE)      Glucose, Urine Normal      Blood, Urine 0.03 (TRACE) (*)     Ketones, Urine NEGATIVE      Bilirubin, Urine NEGATIVE      Urobilinogen, Urine Normal      Nitrite, Urine NEGATIVE      Leukocyte Esterase, Urine 500 Christopher/µL (*)    MICROSCOPIC ONLY, URINE - Abnormal    WBC, Urine >50 (*)     RBC, Urine 11-20 (*)     Squamous Epithelial Cells, Urine 1-9 (SPARSE)      Bacteria, Urine 1+ (*)     Mucus, Urine FEW     MAGNESIUM - Normal    Magnesium 2.20     SARS-COV-2 AND INFLUENZA A/B PCR - Normal    Flu A Result Not Detected      Flu B Result Not Detected      Coronavirus 2019, PCR Not Detected      Narrative:     This assay has received FDA Emergency Use Authorization (EUA) and  is only authorized for the duration of time that circumstances exist to justify the authorization of the emergency use of in vitro diagnostic tests for the detection of SARS-CoV-2 virus and/or diagnosis of COVID-19 infection under section 564(b)(1) of the Act, 21 U.S.C. 360bbb-3(b)(1). Testing for SARS-CoV-2 is only recommended for patients who meet current clinical and/or epidemiological criteria as defined by federal, state, or local public health directives. This assay is an in vitro diagnostic nucleic acid amplification test for the qualitative detection of SARS-CoV-2, Influenza A, and Influenza B from nasopharyngeal specimens and has been validated for use at [facility]. Negative results do not preclude COVID-19 infections or Influenza A/B infections, and should not be used as the sole basis for diagnosis, treatment, or other management decisions. If Influenza A/B and RSV PCR results are negative, testing for Parainfluenza virus, Adenovirus and Metapneumovirus is routinely performed for [facility] pediatric oncology and intensive care inpatients, and is available on other patients by placing an add-on request.    RSV PCR - Normal    RSV PCR  Not Detected      Narrative:     This assay is an FDA-cleared, in vitro diagnostic nucleic acid amplification test for the detection of RSV from nasopharyngeal specimens, and has been validated for use at [facility]. Negative results do not preclude RSV infections, and should not be used as the sole basis for diagnosis, treatment, or other management decisions. If Influenza A/B and RSV PCR results are negative, testing for Parainfluenza virus, Adenovirus and Metapneumovirus is routinely performed for pediatric oncology and intensive care inpatients at [facility], and is available on other patients by placing an add-on request.       SERIAL TROPONIN, INITIAL ([facility]) - Normal    Troponin T, High Sensitivity 8     URINE CULTURE   TROPONIN T SERIES, HIGH SENSITIVITY (0, 2 HR, 6 HR)    Narrative:     The following orders were created for panel order Troponin T Series, High Sensitivity (0, 2HR, 6HR).  Procedure                               Abnormality         Status                     ---------                               -----------         ------                     Serial Troponin, Initial...[[id number]]  Normal              Final result               Serial Troponin, 2 Hour ...[[id number]]                      In process                 Serial Troponin, 6 Hour ...[[id number]]                                                   Please view results for these tests on the individual orders.   URINALYSIS WITH REFLEX CULTURE AND MICROSCOPIC    Narrative:     The following orders were created for panel order Urinalysis with Reflex Culture and Microscopic.  Procedure                               Abnormality         Status                     ---------                               -----------         ------                     Urinalysis with Reflex C...[[id number]]  Abnormal            Final result               Extra Urine Gray Tube[[id number]]                            In process                   Please  view results for these tests on the individual orders.   EXTRA URINE GRAY TUBE   SERIAL TROPONIN,  2 HOUR ([facility])   SERIAL TROPONIN, 6 HOUR ([facility])   SERIAL TROPONIN, 6 HOUR ([facility])         XR chest 1 view   Final Result   No acute cardiopulmonary disease.        Signed by: David Boudreaux 2/21/2024 5:46 PM   Dictation workstation:   [id number]            Procedure  Procedures     Matt Taylor PA-C  02/21/24 2048

## 2024-02-22 ENCOUNTER — TELEPHONE (OUTPATIENT)
Dept: PRIMARY CARE | Facility: CLINIC | Age: 49
End: 2024-02-22
Payer: COMMERCIAL

## 2024-02-22 PROBLEM — F41.9 ANXIETY: Status: ACTIVE | Noted: 2024-02-22

## 2024-02-22 PROBLEM — J30.2 SEASONAL ALLERGIC RHINITIS: Status: ACTIVE | Noted: 2019-08-23

## 2024-02-22 PROBLEM — I20.9 ANGINA PECTORIS (CMS-HCC): Status: ACTIVE | Noted: 2024-02-22

## 2024-02-22 PROBLEM — R05.9 COUGH, UNSPECIFIED: Status: ACTIVE | Noted: 2024-02-22

## 2024-02-22 PROBLEM — L30.9 ECZEMA: Status: ACTIVE | Noted: 2024-02-22

## 2024-02-22 PROBLEM — G47.33 OBSTRUCTIVE SLEEP APNEA SYNDROME: Status: ACTIVE | Noted: 2024-02-22

## 2024-02-22 PROBLEM — I82.90 VENOUS THROMBOSIS: Status: ACTIVE | Noted: 2024-02-22

## 2024-02-22 PROBLEM — G56.02 CARPAL TUNNEL SYNDROME OF LEFT WRIST: Status: ACTIVE | Noted: 2024-02-22

## 2024-02-22 PROBLEM — R06.02 SHORTNESS OF BREATH: Status: ACTIVE | Noted: 2024-02-22

## 2024-02-22 PROBLEM — N30.90 CYSTITIS: Status: ACTIVE | Noted: 2024-02-22

## 2024-02-22 PROBLEM — H60.90 OTITIS EXTERNA: Status: ACTIVE | Noted: 2024-02-22

## 2024-02-22 PROBLEM — I10 PRIMARY HYPERTENSION: Status: ACTIVE | Noted: 2024-02-22

## 2024-02-22 PROBLEM — R73.03 PREDIABETES: Status: ACTIVE | Noted: 2024-02-22

## 2024-02-22 PROBLEM — E78.2 MIXED HYPERLIPIDEMIA: Status: ACTIVE | Noted: 2024-02-22

## 2024-02-22 PROBLEM — K21.9 GASTROESOPHAGEAL REFLUX DISEASE: Status: ACTIVE | Noted: 2024-02-22

## 2024-02-22 PROBLEM — M79.643 HAND PAIN: Status: ACTIVE | Noted: 2024-02-22

## 2024-02-22 PROBLEM — M54.40 ACUTE BACK PAIN WITH SCIATICA: Status: ACTIVE | Noted: 2024-02-22

## 2024-02-22 PROBLEM — N20.0 KIDNEY STONE: Status: ACTIVE | Noted: 2024-02-22

## 2024-02-22 PROBLEM — R42 LIGHTHEADEDNESS: Status: ACTIVE | Noted: 2024-02-22

## 2024-02-22 PROBLEM — M06.9 RHEUMATOID ARTHRITIS (MULTI): Status: ACTIVE | Noted: 2024-02-22

## 2024-02-22 PROBLEM — J06.9 ACUTE UPPER RESPIRATORY INFECTION: Status: ACTIVE | Noted: 2024-02-22

## 2024-02-22 PROBLEM — R52 PAIN, UNSPECIFIED: Status: ACTIVE | Noted: 2023-02-28

## 2024-02-22 PROBLEM — R42 DIZZINESS AND GIDDINESS: Status: ACTIVE | Noted: 2024-02-22

## 2024-02-22 PROBLEM — E03.9 HYPOTHYROIDISM: Status: ACTIVE | Noted: 2024-02-09

## 2024-02-22 LAB
ATRIAL RATE: 63 BPM
P AXIS: 30 DEGREES
P OFFSET: 199 MS
P ONSET: 141 MS
PR INTERVAL: 154 MS
Q ONSET: 218 MS
QRS COUNT: 10 BEATS
QRS DURATION: 82 MS
QT INTERVAL: 442 MS
QTC CALCULATION(BAZETT): 452 MS
QTC FREDERICIA: 449 MS
R AXIS: -7 DEGREES
T AXIS: -40 DEGREES
T OFFSET: 439 MS
VENTRICULAR RATE: 63 BPM

## 2024-02-22 RX ORDER — LEVOCETIRIZINE DIHYDROCHLORIDE 5 MG/1
TABLET, FILM COATED ORAL EVERY 24 HOURS
COMMUNITY
Start: 2023-03-17

## 2024-02-22 RX ORDER — ALBUTEROL SULFATE 90 UG/1
AEROSOL, METERED RESPIRATORY (INHALATION)
COMMUNITY
Start: 2013-04-29 | End: 2024-02-23 | Stop reason: ALTCHOICE

## 2024-02-22 RX ORDER — IPRATROPIUM BROMIDE 42 UG/1
SPRAY, METERED NASAL EVERY 8 HOURS
COMMUNITY
Start: 2023-02-28 | End: 2024-02-23 | Stop reason: ALTCHOICE

## 2024-02-22 RX ORDER — LISINOPRIL 5 MG/1
TABLET ORAL
COMMUNITY
Start: 2015-03-17 | End: 2024-02-23 | Stop reason: ALTCHOICE

## 2024-02-22 RX ORDER — NITROGLYCERIN 0.4 MG/1
TABLET SUBLINGUAL
COMMUNITY
Start: 2019-09-09 | End: 2024-02-23 | Stop reason: ALTCHOICE

## 2024-02-22 RX ORDER — BENZONATATE 200 MG/1
1 CAPSULE ORAL 3 TIMES DAILY PRN
COMMUNITY
Start: 2023-02-28

## 2024-02-22 RX ORDER — FUROSEMIDE 20 MG/1
TABLET ORAL
COMMUNITY
Start: 2021-08-11 | End: 2024-02-23 | Stop reason: ALTCHOICE

## 2024-02-22 RX ORDER — MOMETASONE FUROATE 1 MG/G
1 CREAM TOPICAL
COMMUNITY
Start: 2023-05-11

## 2024-02-22 RX ORDER — DOXYCYCLINE 100 MG/1
100 CAPSULE ORAL 2 TIMES DAILY
COMMUNITY
Start: 2023-03-17 | End: 2023-03-24

## 2024-02-22 RX ORDER — GLUCOSAM/CHONDRO/HERB 149/HYAL 750-100 MG
1 TABLET ORAL EVERY 24 HOURS
COMMUNITY
End: 2024-02-23 | Stop reason: ALTCHOICE

## 2024-02-22 RX ORDER — FLUTICASONE PROPIONATE 50 MCG
SPRAY, SUSPENSION (ML) NASAL EVERY 24 HOURS
COMMUNITY
Start: 2015-05-16 | End: 2024-02-23 | Stop reason: ALTCHOICE

## 2024-02-22 RX ORDER — HYDROCODONE BITARTRATE AND ACETAMINOPHEN 5; 325 MG/1; MG/1
TABLET ORAL
COMMUNITY
Start: 2016-02-11

## 2024-02-22 RX ORDER — OMEPRAZOLE 20 MG/1
TABLET, DELAYED RELEASE ORAL
COMMUNITY
End: 2024-02-23 | Stop reason: ALTCHOICE

## 2024-02-22 RX ORDER — NICOTINE POLACRILEX 2 MG
GUM BUCCAL
COMMUNITY

## 2024-02-22 RX ORDER — CIPROFLOXACIN AND DEXAMETHASONE 3; 1 MG/ML; MG/ML
SUSPENSION/ DROPS AURICULAR (OTIC)
COMMUNITY
Start: 2023-03-17 | End: 2024-02-23 | Stop reason: ALTCHOICE

## 2024-02-22 RX ORDER — ONDANSETRON 4 MG/1
TABLET, ORALLY DISINTEGRATING ORAL
COMMUNITY
End: 2024-02-23 | Stop reason: ALTCHOICE

## 2024-02-22 RX ORDER — CYCLOBENZAPRINE HCL 5 MG
TABLET ORAL EVERY 24 HOURS
COMMUNITY
Start: 2023-06-02

## 2024-02-22 RX ORDER — AZELASTINE 1 MG/ML
SPRAY, METERED NASAL EVERY 12 HOURS
COMMUNITY
Start: 2023-03-17

## 2024-02-22 RX ORDER — ALBUTEROL SULFATE 90 UG/1
AEROSOL, METERED RESPIRATORY (INHALATION)
COMMUNITY
Start: 2023-03-17

## 2024-02-22 RX ORDER — LORATADINE 10 MG/1
TABLET ORAL
COMMUNITY
Start: 2015-05-19 | End: 2024-02-23 | Stop reason: ALTCHOICE

## 2024-02-22 RX ORDER — PSEUDOEPHEDRINE HYDROCHLORIDE 60 MG/1
TABLET ORAL EVERY 6 HOURS
COMMUNITY
Start: 2023-03-17 | End: 2024-02-23 | Stop reason: ALTCHOICE

## 2024-02-22 RX ORDER — AZITHROMYCIN 250 MG/1
TABLET, FILM COATED ORAL
COMMUNITY
Start: 2023-03-08 | End: 2024-02-23 | Stop reason: ALTCHOICE

## 2024-02-22 NOTE — TELEPHONE ENCOUNTER
PT was in ER 2/21/2024 for vertigo and dizziness.  PT wanting to know if this is normal or how long this should last.  PT can't stand up and is really dizzy.  Worst the vertigo has ever been.  She was given an antibiotic for UTI.  Wants to make sure it is ok to take as she has an allergy. Please advise.

## 2024-02-23 ENCOUNTER — OFFICE VISIT (OUTPATIENT)
Dept: PRIMARY CARE | Facility: CLINIC | Age: 49
End: 2024-02-23
Payer: COMMERCIAL

## 2024-02-23 VITALS
TEMPERATURE: 97.1 F | DIASTOLIC BLOOD PRESSURE: 80 MMHG | SYSTOLIC BLOOD PRESSURE: 138 MMHG | HEART RATE: 72 BPM | OXYGEN SATURATION: 97 %

## 2024-02-23 DIAGNOSIS — H81.13 BENIGN PAROXYSMAL POSITIONAL VERTIGO DUE TO BILATERAL VESTIBULAR DISORDER: Primary | ICD-10-CM

## 2024-02-23 LAB — BACTERIA UR CULT: NORMAL

## 2024-02-23 PROCEDURE — 99213 OFFICE O/P EST LOW 20 MIN: CPT | Performed by: INTERNAL MEDICINE

## 2024-02-23 PROCEDURE — 3079F DIAST BP 80-89 MM HG: CPT | Performed by: INTERNAL MEDICINE

## 2024-02-23 PROCEDURE — 3075F SYST BP GE 130 - 139MM HG: CPT | Performed by: INTERNAL MEDICINE

## 2024-02-23 PROCEDURE — 1036F TOBACCO NON-USER: CPT | Performed by: INTERNAL MEDICINE

## 2024-02-23 ASSESSMENT — PAIN SCALES - GENERAL: PAINLEVEL: 0-NO PAIN

## 2024-02-23 NOTE — PROGRESS NOTES
Subjective   Patient ID: Shanti Gonsales is a 48 y.o. female who presents for ER Follow-up.    This is a 48 y.o. who developed an episode of BPPV 2 days ago. Pt had arrived at her dentist appt and started feeling dizzy. She fell outside of the dental office. She was able to make it into the office and started vomiting. She states that she felt the room spinning. She had to keep her eyes closed. EMS was called and transported the pt to  ER where she received IV fluids, anti-nausea meds and meds for vertigo. She had an EKG that was unrevealing. She had a CXR that was normal. She was discharged home with Meclizine, Reglan and Cephalexin (for a presumed UTI). Her urine cx was negative.   She continues to feel unwell.         Review of Systems   All other systems reviewed and are negative.      Objective   /80   Pulse 72   Temp 36.2 °C (97.1 °F)   SpO2 97%     Physical Exam  Vitals reviewed.   Constitutional:       General: She is not in acute distress.     Appearance: She is ill-appearing. She is not toxic-appearing or diaphoretic.   HENT:      Right Ear: Tympanic membrane normal.      Left Ear: Tympanic membrane normal.   Cardiovascular:      Rate and Rhythm: Normal rate and regular rhythm.   Pulmonary:      Breath sounds: Normal breath sounds.   Neurological:      General: No focal deficit present.      Mental Status: She is alert and oriented to person, place, and time.      Comments: In wheelchair   Psychiatric:         Mood and Affect: Mood normal.         Assessment/Plan   Diagnoses and all orders for this visit:  Benign paroxysmal positional vertigo due to bilateral vestibular disorder  Comments:  Continue Meclizine and Reglan as needed. ER records reviewed  Orders:  -     Referral to Physical Therapy; Future

## 2024-02-25 LAB
ATRIAL RATE: 68 BPM
P AXIS: 30 DEGREES
P OFFSET: 197 MS
P ONSET: 144 MS
PR INTERVAL: 152 MS
Q ONSET: 220 MS
QRS COUNT: 11 BEATS
QRS DURATION: 80 MS
QT INTERVAL: 414 MS
QTC CALCULATION(BAZETT): 440 MS
QTC FREDERICIA: 431 MS
R AXIS: -2 DEGREES
T AXIS: -44 DEGREES
T OFFSET: 427 MS
VENTRICULAR RATE: 68 BPM

## 2024-02-29 ENCOUNTER — CLINICAL SUPPORT (OUTPATIENT)
Dept: AUDIOLOGY | Facility: CLINIC | Age: 49
End: 2024-02-29
Payer: COMMERCIAL

## 2024-02-29 DIAGNOSIS — H93.13 TINNITUS OF BOTH EARS: ICD-10-CM

## 2024-02-29 DIAGNOSIS — R42 DIZZINESS: Primary | ICD-10-CM

## 2024-02-29 DIAGNOSIS — H90.41 SENSORINEURAL HEARING LOSS (SNHL) OF RIGHT EAR WITH UNRESTRICTED HEARING OF LEFT EAR: ICD-10-CM

## 2024-02-29 PROCEDURE — 92542 POSITIONAL NYSTAGMUS TEST: CPT | Performed by: AUDIOLOGIST

## 2024-02-29 PROCEDURE — 92557 COMPREHENSIVE HEARING TEST: CPT | Performed by: AUDIOLOGIST

## 2024-02-29 PROCEDURE — 92550 TYMPANOMETRY & REFLEX THRESH: CPT | Performed by: AUDIOLOGIST

## 2024-02-29 NOTE — PROGRESS NOTES
AUDIOLOGY ADULT AUDIOMETRIC EVALUATION    Name:  Shanti Gonsales  :  1975  Age:  48 y.o.  Date of Evaluation:  2024    Reason for visit: Shanti  is seen with her son Rajan in the clinic today for an audiologic evaluation.     HISTORY  Patient reports on 24 she had a sudden attack of room spinning dizziness, lost balance, vomiting lasting several hours.   She was taken to [facility] via ambulance with her son.   She reports that EKG, blood work performed.    She reports she was sent home with meclizine; she stopped taking it 4 days later.  She has slowly improved to now be able to walk; nausea has subsided.    She reports that now, when she stands she feels a little unsteady; afraid to move head.   She reports she has been afraid to drive.   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  She reports a constant ringing in both ears.  She does not notice any significant change in hearing; no significant hearing loss; no fullness or pressure in ears.   Last audiogram was performed 2017 demonstrating normal hearing through 4KHz bilaterally; mild hearing loss at 8KHz right ear; borderline normal at 8KHz for left ear    EVALUATION  See scanned audiogram: “Media” > “Audiology Report”.      RESULTS  Otoscopic Evaluation:  Right Ear: clear ear canal  Left Ear: clear ear canal    Immittance Measures:  Tympanometry:  Right Ear: Type A, normal tympanic membrane mobility with normal middle ear pressure  Left Ear: Type A, normal tympanic membrane mobility with normal middle ear pressure    Acoustic Reflexes:  Ipsilateral Right Ear: Excellent at most comfortable listening level of loudness   Ipsilateral Left Ear: Excellent at most comfortable listening level of loudness   Contralateral Right Ear: did not evaluate  Contralateral Left Ear: did not evaluate    Distortion Product Otoacoustic Emissions (DPOAEs):  Right Ear: Present 1KHz through 5KHz;  "absent at 6KHz and 8KHz   Left Ear: Present 2KHz through 5KHz; absent at 1KHz-1500Hz; 6KHz-8KHz    Audiometry:  Test Technique and Reliability:   Standard audiometry via supra-aural headphones. Reliability is good.    Pure tone air and bone conduction audiometry:  Right Ear: Borderline normal at 4KHz, mild sensorineural hearing loss 6KHz-8KHz   Left Ear: Borderline normal hearing sensitivity 1KHz, 4KHz-8KHz     Speech Audiometry (Word Recognition Scores):   Right Ear: Excellent   Left Ear: Excellent     Hallpike head right elicited patient report of a delayed wave of dizziness lasting seconds; nystagmus was not visualized.    Hallpike head left was negative for dizziness or nystagmus.    Repeat Hallpike head right again elicited \"wave of dizziness\"; performed canalith repositioning.  Repeat Hallpike post repositioning demonstrated resolution of dizziness.      IMPRESSIONS    Borderline normal hearing sensitivity left ear; mild sensorineural hearing loss at 6KHz, 8KHz right ear  Repositioned for possible right sided BPPV (wave of dizziness/no nystagmus); if persistent imbalance, consider Videonystagmography     RECOMMENDATIONS  - Follow up with otolaryngology  - Annual audiologic evaluation, sooner if an acute change is noted.  Discussed the pathophysiology of BPPV.   Recommended for a few days that patient be careful with head and body movement that would normally trigger the dizziness.  Patient may feel a little \"off\" the next few days post repositioning.      -If imbalance persists; consider VNG    PATIENT EDUCATION  Discussed results, impressions and recommendations with the patient. Questions were addressed and the patient was encouraged to contact our office should concerns arise.    Time for this encounter: 1100/1215    Jeanette Mistry M.A., CCC/A   Licensed Audiologist    "

## 2024-02-29 NOTE — LETTER
"     EVALUATION OF BENIGN POSITIONAL PAROXYSMAL VERTIGO BPPV    HISTORY:  Patient reports on 2/21/24 she had a sudden attack of room spinning dizziness, lost balance, vomiting lasting several hours.   She was taken to [facility] via ambulance with her son following behind the ambulance.    She reports that EKG, blood work performed.    She reports she was sent home with meclizine; she stopped taking it 4 days later.  She has slowly improved to now be able to walk; nausea has subsided.    She reports that now, when she stands she feels a little unsteady; afraid to move head.   She reports she has been afraid to drive.   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  She reports a constant ringing in both ears.  She does not notice any significant change in hearing; no significant hearing loss; no fullness or pressure in ears.   Last audiogram was performed 8/11/2017 demonstrating normal hearing through 4KHz bilaterally; mild hearing loss at 8KHz right ear; borderline normal at 8KHz for left ear       EVALUATION: Hallpike head right elicited a delayed sensation of a wave of dizziness lasting seconds; no nystagmus was visualized.   Hallpike head left was negative for any dizziness or nystagmus  Repeated Hallpike head right; patient report of a wave of dizziness; proceeded with canalith repositioning; patient also reported a brief wave of dizziness in 3rd position of rolling left.       Waited about ten minutes; repeat Hallpike head right demonstrated resolution of patient report of dizziness.    Repositioned once more.      Discussed the pathophysiology of BPPV.   Recommended for a few days that the patient be careful with head and body movement that would normally trigger the dizziness.  Patient may feel a little \"off\" the next few days post repositioning.     RECOMMENDATIONS:   *not classic BPPV.   Based on patient's report of history, possible viral; unilateral " weakness.  If patient continues to feel unsteady next week, will consult with Pedro Pablo Vu MD for consideration of VNG prior to his follow-up scheduled for May 2024.       Jeanette Mistry M.A., CCC/A

## 2024-02-29 NOTE — LETTER
AUDIOLOGY ADULT AUDIOMETRIC EVALUATION    Name:  Shanti Gonsales  :  1975  Age:  48 y.o.  Date of Evaluation:  2024    Reason for visit: Shanti  is seen with her son Rajan in the clinic today for an audiologic evaluation.     HISTORY  Patient reports on 24 she had a sudden attack of room spinning dizziness, lost balance, vomiting lasting several hours.   She was taken to [facility] via ambulance with her son.   She reports that EKG, blood work performed.    She reports she was sent home with meclizine; she stopped taking it 4 days later.  She has slowly improved to now be able to walk; nausea has subsided.    She reports that now, when she stands she feels a little unsteady; afraid to move head.   She reports she has been afraid to drive.   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  She reports a constant ringing in both ears.  She does not notice any significant change in hearing; no significant hearing loss; no fullness or pressure in ears.   Last audiogram was performed 2017 demonstrating normal hearing through 4KHz bilaterally; mild hearing loss at 8KHz right ear; borderline normal at 8KHz for left ear    EVALUATION  See scanned audiogram: “Media” > “Audiology Report”.      RESULTS  Otoscopic Evaluation:  Right Ear: clear ear canal  Left Ear: clear ear canal    Immittance Measures:  Tympanometry:  Right Ear: Type A, normal tympanic membrane mobility with normal middle ear pressure  Left Ear: Type A, normal tympanic membrane mobility with normal middle ear pressure    Acoustic Reflexes:  Ipsilateral Right Ear: Excellent at most comfortable listening level of loudness   Ipsilateral Left Ear: Excellent at most comfortable listening level of loudness   Contralateral Right Ear: did not evaluate  Contralateral Left Ear: did not evaluate    Distortion Product Otoacoustic Emissions (DPOAEs):  Right Ear: Present 1KHz through 5KHz;  "absent at 6KHz and 8KHz   Left Ear: Present 2KHz through 5KHz; absent at 1KHz-1500Hz; 6KHz-8KHz    Audiometry:  Test Technique and Reliability:   Standard audiometry via supra-aural headphones. Reliability is good.    Pure tone air and bone conduction audiometry:  Right Ear: Borderline normal at 4KHz, mild sensorineural hearing loss 6KHz-8KHz   Left Ear: Borderline normal hearing sensitivity 1KHz, 4KHz-8KHz     Speech Audiometry (Word Recognition Scores):   Right Ear: Excellent   Left Ear: Excellent     Hallpike head right elicited patient report of a delayed wave of dizziness lasting seconds; nystagmus was not visualized.    Hallpike head left was negative for dizziness or nystagmus.    Repeat Hallpike head right again elicited \"wave of dizziness\"; performed canalith repositioning.  Repeat Hallpike post repositioning demonstrated resolution of dizziness.      IMPRESSIONS    Borderline normal hearing sensitivity left ear; mild sensorineural hearing loss at 6KHz, 8KHz right ear  Repositioned for possible right sided BPPV (wave of dizziness/no nystagmus); if persistent imbalance, consider Videonystagmography     RECOMMENDATIONS  - Follow up with otolaryngology  - Annual audiologic evaluation, sooner if an acute change is noted.  Discussed the pathophysiology of BPPV.   Recommended for a few days that patient be careful with head and body movement that would normally trigger the dizziness.  Patient may feel a little \"off\" the next few days post repositioning.      -If imbalance persists; consider VNG    PATIENT EDUCATION  Discussed results, impressions and recommendations with the patient. Questions were addressed and the patient was encouraged to contact our office should concerns arise.    Time for this encounter: 1100/1215    Jeanette Mistry M.A., CCC/A   Licensed Audiologist    "

## 2024-02-29 NOTE — PROGRESS NOTES
"   EVALUATION OF BENIGN POSITIONAL PAROXYSMAL VERTIGO BPPV    HISTORY:  Patient reports on 2/21/24 she had a sudden attack of room spinning dizziness, lost balance, vomiting lasting several hours.   She was taken to [facility] via ambulance with her son following behind the ambulance.    She reports that EKG, blood work performed.    She reports she was sent home with meclizine; she stopped taking it 4 days later.  She has slowly improved to now be able to walk; nausea has subsided.    She reports that now, when she stands she feels a little unsteady; afraid to move head.   She reports she has been afraid to drive.   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  She reports a constant ringing in both ears.  She does not notice any significant change in hearing; no significant hearing loss; no fullness or pressure in ears.   Last audiogram was performed 8/11/2017 demonstrating normal hearing through 4KHz bilaterally; mild hearing loss at 8KHz right ear; borderline normal at 8KHz for left ear       EVALUATION: Hallpike head right elicited a delayed sensation of a wave of dizziness lasting seconds; no nystagmus was visualized.   Hallpike head left was negative for any dizziness or nystagmus  Repeated Hallpike head right; patient report of a wave of dizziness; proceeded with canalith repositioning; patient also reported a brief wave of dizziness in 3rd position of rolling left.       Waited about ten minutes; repeat Hallpike head right demonstrated resolution of patient report of dizziness.    Repositioned once more.      Discussed the pathophysiology of BPPV.   Recommended for a few days that the patient be careful with head and body movement that would normally trigger the dizziness.  Patient may feel a little \"off\" the next few days post repositioning.     RECOMMENDATIONS:   *not classic BPPV.   Based on patient's report of history, possible viral; unilateral " weakness.  If patient continues to feel unsteady next week, will consult with Pedro Pablo Vu MD for consideration of VNG prior to his follow-up scheduled for May 2024.       Jeanette Mistry M.A., CCC/A

## 2024-03-01 ENCOUNTER — TELEPHONE (OUTPATIENT)
Dept: PRIMARY CARE | Facility: CLINIC | Age: 49
End: 2024-03-01
Payer: COMMERCIAL

## 2024-03-01 NOTE — LETTER
March 1, 2024     Patient: Shanti Gonsales   YOB: 1975   Date of Visit: 2/23/2024       To Whom It May Concern:    Shanti Gonsales was seen in my clinic on 2/23/24?. Please excuse Shanti for her absence from work 2/21-3/3. She can return to work on 3/4.    If you have any questions or concerns, please don't hesitate to call.         Sincerely,         Dede Bermeo M.D.        CC: No Recipients

## 2024-03-01 NOTE — TELEPHONE ENCOUNTER
Patient is asking if she can have a return to work note from 2/21 to rtw on 3/4 due to the vertigo. Please advise if note can be written.

## 2024-03-09 DIAGNOSIS — F41.9 ANXIETY: ICD-10-CM

## 2024-03-09 DIAGNOSIS — I10 ESSENTIAL (PRIMARY) HYPERTENSION: ICD-10-CM

## 2024-03-11 RX ORDER — SERTRALINE HYDROCHLORIDE 100 MG/1
100 TABLET, FILM COATED ORAL DAILY
Qty: 90 TABLET | Refills: 3 | Status: SHIPPED | OUTPATIENT
Start: 2024-03-11 | End: 2024-12-06

## 2024-03-11 RX ORDER — METOPROLOL TARTRATE 25 MG/1
12.5 TABLET, FILM COATED ORAL 2 TIMES DAILY
Qty: 90 TABLET | Refills: 3 | Status: SHIPPED | OUTPATIENT
Start: 2024-03-11

## 2024-03-12 ENCOUNTER — CLINICAL SUPPORT (OUTPATIENT)
Dept: PHYSICAL THERAPY | Facility: CLINIC | Age: 49
End: 2024-03-12
Payer: COMMERCIAL

## 2024-03-12 ENCOUNTER — HOSPITAL ENCOUNTER (OUTPATIENT)
Dept: RADIOLOGY | Facility: HOSPITAL | Age: 49
Discharge: HOME | End: 2024-03-12
Payer: COMMERCIAL

## 2024-03-12 VITALS — BODY MASS INDEX: 41.91 KG/M2 | WEIGHT: 222 LBS | HEIGHT: 61 IN

## 2024-03-12 DIAGNOSIS — H81.10 BENIGN PAROXYSMAL POSITIONAL VERTIGO, UNSPECIFIED LATERALITY: ICD-10-CM

## 2024-03-12 DIAGNOSIS — R42 VERTIGO: Primary | ICD-10-CM

## 2024-03-12 DIAGNOSIS — Z12.31 BREAST CANCER SCREENING BY MAMMOGRAM: ICD-10-CM

## 2024-03-12 PROCEDURE — 77067 SCR MAMMO BI INCL CAD: CPT

## 2024-03-12 PROCEDURE — 97161 PT EVAL LOW COMPLEX 20 MIN: CPT | Mod: GP | Performed by: PHYSICAL THERAPIST

## 2024-03-12 PROCEDURE — 97530 THERAPEUTIC ACTIVITIES: CPT | Mod: GP | Performed by: PHYSICAL THERAPIST

## 2024-03-12 ASSESSMENT — PAIN - FUNCTIONAL ASSESSMENT: PAIN_FUNCTIONAL_ASSESSMENT: 0-10

## 2024-03-12 ASSESSMENT — PAIN SCALES - GENERAL: PAINLEVEL_OUTOF10: 0 - NO PAIN

## 2024-03-12 NOTE — PROGRESS NOTES
" Vestibular Evaluation    Patient Name: Shanti Gonsales  MRN: [id number]  Today's Date: 03/12/24  Low complexity due to patient's clinical presentation being stable and uncomplicated by any significant comorbidities that may affect rehab tolerance and progression.  Time Calculation  Start Time: 1500  Stop Time: 1540  Time Calculation (min): 40 min  PT Therapeutic Procedures Time Entry  Therapeutic Activity Time Entry: 15     Insurance:  Visit number: Eval of 8  Authorization info: NO AUTH / 20V - 0 used / DEDUCT $2500 - $0 met / 80% COVERED   Insurance Type: Payor: [facility] / Plan: [facility] / Product Type: *No Product type* /     Current Problem:   1. Vertigo  Follow Up In Physical Therapy      2. Benign paroxysmal positional vertigo, unspecified laterality  Referral to Physical Therapy    Follow Up In Physical Therapy    Continue Meclizine and Reglan as needed. ER records reviewed          Subjective   General Visit Information:  General  General Comment: Pt went to the dentist and got extremely dizzy walking into the office. She started getting increased dizziness and decided to go to ER. She was told that she had vertigo and was unable to get up for 3 days, even had difficulty with eyes closed. She went to ENT office and had BPPV checked. She is unable to see ENT until may, the audiologist did not not any nystagmus but patient did report symptoms during shmuel hallpike testing. She reports that reports a sensastion of \"off\" and imbalance but does not report any spinning sensation with change of position.  Precautions:  Precautions  Precautions Comment: None  Pain:  Pain Assessment  Pain Assessment: 0-10  Pain Score: 0 - No pain  Prior Level of Function:  Level of [place]: Independent with ADLs and functional transfers    Objective     Neuro Oculomotor:  Range of Motion: Normal  Smooth Pursuit: Normal  Horizontal Saccades: Normal  Vertical Saccades: Normal  Eye Alignment : " Normal  Distraction Cover: Normal  Distraction Uncover: Normal  Convergence: Normal  Neuro Vestibular:  Horizontal VOR: Positive (Noted nystagmus)  Vertical VOR: Negative  R head thrust: Positive (Noted small saccadic correction however no symptom reproduction)  L head thrust: Negative  Positional Testing:  Positional Testing Comment: Will test at later date    Outcome Measures:  Other Measures  Dynamic Gait Index (DGI): 10/12 (4 point DGI)     Treatments:  Therapeutic Activity  Therapeutic Activity Performed: Yes  Therapeutic Activity 1: Educated on vestibular system in relation to current problems.    Balance/Neuromuscular Re-Education  Balance/Neuromuscular Re-Education Activity Performed: Yes  Balance/Neuromuscular Re-Education Activity 1: Access Code [email or web address]  - Seated Gaze Stabilization with Head Rotation  - 1 x daily - 7 x weekly - 3 sets - 10 reps    OP EDUCATION:  Outpatient Education  Individual(s) Educated: Patient  Education Provided: Anatomy, Home Exercise Program, POC, Home Safety  Risk and Benefits Discussed with Patient/Caregiver/Other: yes  Patient/Caregiver Demonstrated Understanding: yes  Plan of Care Discussed and Agreed Upon: yes  Patient Response to Education: Patient/Caregiver Performed Return Demonstration of Exercises/Activities, Patient/Caregiver Asked Appropriate Questions    Assessment   Assessment:   PT Assessment Results: Impaired balance (Dizziness)  Rehab Prognosis: Good  Assessment Comment: Pt is a 48 y.o female presenting to therapy with acute dizziness. Pt was found to have positive finding with horizontal VOR and R head thrust as well as reduction in gait stability during DGI. Positional testing not performed at this time due to patient hesitation and no symptom report with postional change will assess as needed. At this time pt appears to have general hypofunction for potential acute loss given symptoms report however pt will be going to see ENT for further testing. At this time  pt would benefit from skilled physical therapy in order to reduce dizziness and prevent further functional decline.      Plan:  Treatment/Interventions: Canalith repositioning, Dry needling, Education/ Instruction, Gait training, Hot pack, Manual therapy, Neuromuscular re-education, Taping techniques, Therapeutic activities, Therapeutic exercises  PT Plan: Skilled PT  PT Frequency: 1 time per week  Duration: 8 visit + eval  Onset Date: 03/12/24  Number of Treatments Authorized: 20  Rehab Potential: Good  Plan of Care Agreement: Patient    Goals:  Active       PT Problem       PT Goal 1       Start:  03/12/24    Expected End:  06/10/24       Pt will be 100% IND with HEP in 8 weeks in order to maintain progress with therapy.           PT Goal 2       Start:  03/12/24    Expected End:  06/10/24       Pt will report a reduction in dizziness score to 0/10 in 8 weeks in order to improve work related tasks.          PT Goal 3       Start:  03/12/24    Expected End:  06/10/24       Pt will improve DGI score to 12/12 in 8 weeks in order to reduce fall risk.          PT Goal 4       Start:  03/12/24    Expected End:  06/10/24       Pt will demonstrate subjective improvement of ADLs and recreational activities through improved score of 100% on ABC in 8 weeks for reduce fall risk.

## 2024-03-14 ENCOUNTER — TELEPHONE (OUTPATIENT)
Dept: PRIMARY CARE | Facility: CLINIC | Age: 49
End: 2024-03-14
Payer: COMMERCIAL

## 2024-03-15 ENCOUNTER — OFFICE VISIT (OUTPATIENT)
Dept: PRIMARY CARE | Facility: CLINIC | Age: 49
End: 2024-03-15
Payer: COMMERCIAL

## 2024-03-15 VITALS
SYSTOLIC BLOOD PRESSURE: 120 MMHG | TEMPERATURE: 97.3 F | OXYGEN SATURATION: 95 % | BODY MASS INDEX: 41.57 KG/M2 | HEART RATE: 69 BPM | DIASTOLIC BLOOD PRESSURE: 82 MMHG | WEIGHT: 220 LBS

## 2024-03-15 DIAGNOSIS — H60.502 ACUTE OTITIS EXTERNA OF LEFT EAR, UNSPECIFIED TYPE: Primary | ICD-10-CM

## 2024-03-15 PROCEDURE — 1036F TOBACCO NON-USER: CPT | Performed by: INTERNAL MEDICINE

## 2024-03-15 PROCEDURE — 3074F SYST BP LT 130 MM HG: CPT | Performed by: INTERNAL MEDICINE

## 2024-03-15 PROCEDURE — 99213 OFFICE O/P EST LOW 20 MIN: CPT | Performed by: INTERNAL MEDICINE

## 2024-03-15 PROCEDURE — 3079F DIAST BP 80-89 MM HG: CPT | Performed by: INTERNAL MEDICINE

## 2024-03-15 RX ORDER — CIPROFLOXACIN AND DEXAMETHASONE 3; 1 MG/ML; MG/ML
4 SUSPENSION/ DROPS AURICULAR (OTIC) 2 TIMES DAILY
Qty: 7.5 ML | Refills: 0 | Status: SHIPPED | OUTPATIENT
Start: 2024-03-15 | End: 2024-03-22

## 2024-03-15 ASSESSMENT — PATIENT HEALTH QUESTIONNAIRE - PHQ9
2. FEELING DOWN, DEPRESSED OR HOPELESS: NOT AT ALL
1. LITTLE INTEREST OR PLEASURE IN DOING THINGS: NOT AT ALL
SUM OF ALL RESPONSES TO PHQ9 QUESTIONS 1 AND 2: 0

## 2024-03-15 ASSESSMENT — PAIN SCALES - GENERAL: PAINLEVEL: 5

## 2024-03-15 NOTE — PROGRESS NOTES
Subjective   Patient ID: Shanti Gonsales is a 48 y.o. female who presents for Earache.    Earache   There is pain in the left ear. This is a new problem. The current episode started in the past 7 days. The problem occurs every few minutes. The problem has been unchanged. There has been no fever. Pertinent negatives include no ear discharge or hearing loss. She has tried nothing for the symptoms. There is no history of a chronic ear infection or hearing loss.        Review of Systems   HENT:  Positive for ear pain. Negative for ear discharge and hearing loss.        Objective   /82   Pulse 69   Temp 36.3 °C (97.3 °F)   Wt 99.8 kg (220 lb)   SpO2 95%   BMI 41.57 kg/m²     Physical Exam  Constitutional:       Appearance: Normal appearance.   HENT:      Right Ear: Tympanic membrane, ear canal and external ear normal. There is no impacted cerumen.      Left Ear: Tympanic membrane normal. There is no impacted cerumen.      Ears:      Comments: Mild swollen left ear canal  Cardiovascular:      Rate and Rhythm: Normal rate and regular rhythm.      Heart sounds: Normal heart sounds.   Pulmonary:      Effort: Pulmonary effort is normal.      Breath sounds: Normal breath sounds.   Neurological:      General: No focal deficit present.      Mental Status: She is alert and oriented to person, place, and time.   Psychiatric:         Mood and Affect: Mood normal.         Assessment/Plan   Diagnoses and all orders for this visit:  Acute otitis externa of left ear, unspecified type  -     ciprofloxacin-dexamethasone (CiproDEX) otic suspension; Administer 4 drops into the left ear 2 times a day for 7 days.

## 2024-03-25 ENCOUNTER — APPOINTMENT (OUTPATIENT)
Dept: PHYSICAL THERAPY | Facility: CLINIC | Age: 49
End: 2024-03-25
Payer: COMMERCIAL

## 2024-03-27 ENCOUNTER — APPOINTMENT (OUTPATIENT)
Dept: PRIMARY CARE | Facility: CLINIC | Age: 49
End: 2024-03-27
Payer: COMMERCIAL

## 2024-04-01 ENCOUNTER — OFFICE VISIT (OUTPATIENT)
Dept: PRIMARY CARE | Facility: CLINIC | Age: 49
End: 2024-04-01
Payer: COMMERCIAL

## 2024-04-01 VITALS
OXYGEN SATURATION: 97 % | SYSTOLIC BLOOD PRESSURE: 110 MMHG | WEIGHT: 227 LBS | RESPIRATION RATE: 18 BRPM | BODY MASS INDEX: 42.86 KG/M2 | HEIGHT: 61 IN | HEART RATE: 79 BPM | DIASTOLIC BLOOD PRESSURE: 64 MMHG | TEMPERATURE: 97.8 F

## 2024-04-01 DIAGNOSIS — R73.03 PREDIABETES: Primary | ICD-10-CM

## 2024-04-01 PROCEDURE — 1036F TOBACCO NON-USER: CPT | Performed by: FAMILY MEDICINE

## 2024-04-01 PROCEDURE — 3078F DIAST BP <80 MM HG: CPT | Performed by: FAMILY MEDICINE

## 2024-04-01 PROCEDURE — 3074F SYST BP LT 130 MM HG: CPT | Performed by: FAMILY MEDICINE

## 2024-04-01 PROCEDURE — 99214 OFFICE O/P EST MOD 30 MIN: CPT | Performed by: FAMILY MEDICINE

## 2024-04-01 RX ORDER — METFORMIN HYDROCHLORIDE 500 MG/1
500 TABLET, EXTENDED RELEASE ORAL
Qty: 90 TABLET | Refills: 1 | Status: SHIPPED | OUTPATIENT
Start: 2024-04-01 | End: 2025-04-01

## 2024-04-01 ASSESSMENT — PAIN SCALES - GENERAL: PAINLEVEL: 0-NO PAIN

## 2024-04-01 NOTE — PROGRESS NOTES
History Of Present Illness  Shanti Gonsales is a 48 y.o. female presenting for Establish Care  .    HPI   Here to establish care. Had physical with previous PCP in Feb 2024.     Had recent ER visit. Had vertigo flare up a month or so ago and had gone to the ER. Today symptoms improved. Started with vestibular therapy. Has appt with ENT tomorrow.    A1c was recently 6.4%, patient not aware. Diet is unrestricted, fast food. [employer] or [employer] or [employer]. No soda pop. 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 Going through a lot, dad in hospice.     Past Medical History  Patient Active Problem List    Diagnosis Date Noted    Vertigo 03/12/2024    Acute back pain with sciatica 02/22/2024    Acute upper respiratory infection 02/22/2024    Angina pectoris (CMS/[facility]) 02/22/2024    Anxiety 02/22/2024    Carpal tunnel syndrome of left wrist 02/22/2024    Cough, unspecified 02/22/2024    Cystitis 02/22/2024    Dizziness and giddiness 02/22/2024    Eczema 02/22/2024    Gastroesophageal reflux disease 02/22/2024    Hand pain 02/22/2024    Kidney stone 02/22/2024    Lightheadedness 02/22/2024    Mixed hyperlipidemia 02/22/2024    Obstructive sleep apnea syndrome 02/22/2024    Otitis externa 02/22/2024    Primary hypertension 02/22/2024    Rheumatoid arthritis (CMS/[facility]) 02/22/2024    Shortness of breath 02/22/2024    Venous thrombosis 02/22/2024    Prediabetes 02/22/2024    Hypothyroidism 02/09/2024    Pain, unspecified 02/28/2023    Seasonal allergic rhinitis 08/23/2019        Medications  Current Outpatient Medications on File Prior to Visit   Medication Sig    albuterol 90 mcg/actuation inhaler INHALE 2 PUFFS INTO THE LUNGS EVERY 4 HOURS AS NEEDED for 30    gemfibrozil (Lopid) 600 mg tablet Take 1 tablet (600 mg) by mouth 2 times a day.    levocetirizine (Xyzal) 5 mg tablet Take by mouth once every 24 hours.    levothyroxine (Synthroid, Levoxyl) 100 mcg tablet TAKE 1 TABLET BY MOUTH EVERY DAY IN  THE MORNING ON EMPTY STOMACH FOR 90 DAYS    metoprolol tartrate (Lopressor) 25 mg tablet TAKE 1/2 TABLET BY MOUTH TWICE A DAY    mometasone (Elocon) 0.1 % cream 1 Application.    omeprazole (PriLOSEC) 20 mg DR capsule TAKE 1 CAPSULE BY MOUTH EVERY DAY FOR 90 DAYS    sertraline (Zoloft) 100 mg tablet TAKE 1 TABLET BY MOUTH EVERY DAY FOR 90 DAYS    azelastine (Astelin) 137 mcg (0.1 %) nasal spray Administer into affected nostril(s) every 12 hours.    benzonatate (Tessalon) 200 mg capsule Take 1 capsule (200 mg) by mouth 3 times a day as needed.    biotin 1 mg capsule Biotin    cyclobenzaprine (Flexeril) 5 mg tablet Take by mouth once every 24 hours.    HYDROcodone-acetaminophen (Norco) 5-325 mg tablet Take by mouth.    sertraline (Zoloft) 50 mg tablet Take 1 tablet (50 mg) by mouth.     No current facility-administered medications on file prior to visit.        Surgical History  She has no past surgical history on file.     Social History  She reports that she has never smoked. She has never used smokeless tobacco. She reports that she does not drink alcohol and does not use drugs.    Family History  Family History   Problem Relation Name Age of Onset    Breast cancer Mother  35    Lung cancer Father      COPD Father      Emphysema Father      Heart failure Father      Prostate cancer Father          Allergies  Penicillins    Immunizations  Immunization History   Administered Date(s) Administered    Flu vaccine (IIV4), preservative free *Check age/dose* 10/13/2020, 10/19/2021    Flu vaccine, quadrivalent, no egg protein, age 6 month or greater (FLUCELVAX) 10/16/2020    Influenza, injectable, quadrivalent 10/10/2019, 10/04/2022    Influenza, seasonal, injectable, preservative free 12/09/2015    Moderna SARS-CoV-2 Vaccination 01/08/2021, 02/04/2021    Tdap vaccine, age 7 year and older (BOOSTRIX, ADACEL) 09/01/2011    Zoster vaccine, recombinant, adult (SHINGRIX) 02/09/2024        ROS  Negative, except as discussed in  "HPI     Vitals  /64 (BP Location: Left arm, Patient Position: Sitting, BP Cuff Size: Adult)   Pulse 79   Temp 36.6 °C (97.8 °F) (Temporal)   Resp 18   Ht 1.549 m (5' 1\")   Wt 103 kg (227 lb)   LMP 04/01/2020   SpO2 97%   BMI 42.89 kg/m²      Physical Exam  Vitals and nursing note reviewed.   Constitutional:       General: She is not in acute distress.     Appearance: Normal appearance.   Cardiovascular:      Rate and Rhythm: Normal rate and regular rhythm.      Heart sounds: Normal heart sounds.   Pulmonary:      Effort: No respiratory distress.      Breath sounds: Normal breath sounds.   Neurological:      General: No focal deficit present.      Mental Status: She is alert. Mental status is at baseline.         Relevant Results  Lab Results   Component Value Date    WBC 8.8 02/21/2024    WBC 10.5 02/09/2024    HGB 13.5 02/21/2024    HGB 13.3 02/09/2024    HCT 42.0 02/21/2024    HCT 41.6 02/09/2024    MCV 82 02/21/2024    MCV 82 02/09/2024     02/21/2024     02/09/2024     Lab Results   Component Value Date     02/21/2024     02/09/2024    K 4.0 02/21/2024    K 4.7 02/09/2024    CL 99 02/21/2024     02/09/2024    CO2 25 02/21/2024    CO2 26 02/09/2024    BUN 26 (H) 02/21/2024    BUN 20 02/09/2024    CREATININE 1.30 02/21/2024    CREATININE 0.90 02/09/2024    CALCIUM 9.7 02/21/2024    CALCIUM 9.5 02/09/2024    PROT 8.3 (H) 02/21/2024    PROT 7.2 02/09/2024    BILITOT 0.2 02/21/2024    BILITOT 0.4 02/09/2024    ALKPHOS 76 02/21/2024    ALKPHOS 86 02/09/2024    ALT 16 02/21/2024    ALT 19 02/09/2024    AST 16 02/21/2024    AST 17 02/09/2024    GLUCOSE 125 (H) 02/21/2024    GLUCOSE 99 02/09/2024     Lab Results   Component Value Date    HGBA1C 6.4 (H) 02/09/2024     Lab Results   Component Value Date    TSH 2.63 02/09/2024      Lab Results   Component Value Date    CHOL 218 (H) 02/09/2024    TRIG 293 (H) 02/09/2024    HDL 39.0 (L) 02/09/2024           Assessment/Plan "   Shanti was seen today for establish care.  Diagnoses and all orders for this visit:  Prediabetes (Primary)  -     metFORMIN XR (Glucophage-XR) 500 mg 24 hr tablet; Take 1 tablet (500 mg) by mouth once daily in the evening. Take with meals. Do not crush, chew, or split.  -     Referral to Nutrition Services; Future  -     Hemoglobin A1C; Future  -     Basic metabolic panel; Future  Discussed TLC for weight loss, metformin for prediabetes. Follow up in 3 months for A1c recheck and see if weight loss medications are needed.     There are no discontinued medications.     Counseling:   Medication education:   -Education:  The patient is counseled regarding potential side-effects of any and all new medications  -Understanding:  Patient expressed understanding of information discussed today  -Adherence:  No barriers to adherence identified    Final treatment plan is a result of shared decision making with patient.         Rip Lynn MD

## 2024-04-01 NOTE — LETTER
April 1, 2024     Patient: Shanti Gonsales   YOB: 1975   Date of Visit: 4/1/2024       To Whom It May Concern:    Shanit Gonsales was seen in my clinic on 4/1/2024 at 10:30 am. Please excuse Shanti for her absence from work on this day to make the appointment. She may return to work on Wednesday 4/3/2024.    If you have any questions or concerns, please don't hesitate to call.         Sincerely,         Rip Lynn MD        CC: No Recipients

## 2024-04-02 ENCOUNTER — TREATMENT (OUTPATIENT)
Dept: PHYSICAL THERAPY | Facility: CLINIC | Age: 49
End: 2024-04-02
Payer: COMMERCIAL

## 2024-04-02 ENCOUNTER — OFFICE VISIT (OUTPATIENT)
Dept: OTOLARYNGOLOGY | Facility: CLINIC | Age: 49
End: 2024-04-02
Payer: COMMERCIAL

## 2024-04-02 VITALS — BODY MASS INDEX: 42.14 KG/M2 | HEIGHT: 62 IN | WEIGHT: 229 LBS

## 2024-04-02 DIAGNOSIS — H81.11 BENIGN PAROXYSMAL POSITIONAL VERTIGO OF RIGHT EAR: Primary | ICD-10-CM

## 2024-04-02 DIAGNOSIS — R42 VERTIGO: ICD-10-CM

## 2024-04-02 DIAGNOSIS — H90.3 SENSORINEURAL HEARING LOSS (SNHL) OF BOTH EARS: ICD-10-CM

## 2024-04-02 DIAGNOSIS — H93.13 TINNITUS OF BOTH EARS: ICD-10-CM

## 2024-04-02 DIAGNOSIS — H81.10 BENIGN PAROXYSMAL POSITIONAL VERTIGO, UNSPECIFIED LATERALITY: ICD-10-CM

## 2024-04-02 PROCEDURE — 99203 OFFICE O/P NEW LOW 30 MIN: CPT | Performed by: OTOLARYNGOLOGY

## 2024-04-02 PROCEDURE — 97112 NEUROMUSCULAR REEDUCATION: CPT | Mod: GP,CQ

## 2024-04-02 PROCEDURE — 97530 THERAPEUTIC ACTIVITIES: CPT | Mod: GP,CQ

## 2024-04-02 ASSESSMENT — PAIN - FUNCTIONAL ASSESSMENT: PAIN_FUNCTIONAL_ASSESSMENT: 0-10

## 2024-04-02 ASSESSMENT — PAIN SCALES - GENERAL: PAINLEVEL_OUTOF10: 0 - NO PAIN

## 2024-04-02 NOTE — PROGRESS NOTES
"Physical Therapy Treatment    Patient Name: Shanti Gonsales  MRN: [id number]  Today's Date: 4/2/2024  Time Calculation  Start Time: 1155  Stop Time: 1240  Time Calculation (min): 45 min  PT Therapeutic Procedures Time Entry  Neuromuscular Re-Education Time Entry: 23  Therapeutic Activity Time Entry: 22    Insurance:  Visit number: 1 of 8  Authorization info: NO AUTH / 20V - 0 used   Insurance Type: Payor: [facility] / Plan: [facility] / Product Type: *No Product type* /     Current Problem   1. Benign paroxysmal positional vertigo, unspecified laterality  Follow Up In Physical Therapy    Continue Meclizine and Reglan as needed. ER records reviewed      2. Vertigo  Follow Up In Physical Therapy          Subjective   General    Patient just got done seeing Dr. Koroma which diagnosed with with R BPPV. Patient states she has not had any \"dizziness\" since February. Reports she instead feels unsteadiness when she's on her feet. Has tried sleeping on her R side at night and does not get dizzy. When she sits up in bed she has to sit up very slowly.     Precautions:   None    Pain   Pain Assessment: 0-10  Pain Score: 0 - No pain  Post Treatment Pain Level   none    Objective   - R Ernesto Hallpike    Treatments:    Neuro Re-ed:   Balance/Neuromuscular Re-Education  Balance/Neuromuscular Re-Education Activity Performed: Yes  Balance/Neuromuscular Re-Education Activity 1: R [place] Hallpike  Balance/Neuromuscular Re-Education Activity 2: standing head turns  Balance/Neuromuscular Re-Education Activity 3: romberg head turns 3x  Balance/Neuromuscular Re-Education Activity 4: romber head nods 3x    Therapeutic activity:  Extensive education regarding- anatomy of inner ear, presentation of PBBV, diagnosis of vestibular hypofunction, and management of symptoms.       Assessment   Assessment:    Negative for R BPPV this date with no s/s during test. Extensive education throughout regarding vestibular system, " diagnosis of hypofunction, and management of symptoms. Mild LOB with romberg positions but able to stabilize with therapist support.     Plan:    balance    OP EDUCATION:   Extensive Assessment and education regarding vestibular diagnosis of hypofunction, POC, and management of symptoms     Goals:   Active       PT Problem       PT Goal 1       Start:  03/12/24    Expected End:  06/10/24       Pt will be 100% IND with HEP in 8 weeks in order to maintain progress with therapy.           PT Goal 2       Start:  03/12/24    Expected End:  06/10/24       Pt will report a reduction in dizziness score to 0/10 in 8 weeks in order to improve work related tasks.          PT Goal 3       Start:  03/12/24    Expected End:  06/10/24       Pt will improve DGI score to 12/12 in 8 weeks in order to reduce fall risk.          PT Goal 4       Start:  03/12/24    Expected End:  06/10/24       Pt will demonstrate subjective improvement of ADLs and recreational activities through improved score of 100% on ABC in 8 weeks for reduce fall risk.

## 2024-04-02 NOTE — PROGRESS NOTES
Chief Complaint   Patient presents with    Dizziness     LOV: 8/2017 VERTIGO, SEEN AT ER IN FEB. 2024, CURRENTLY DOING PT, HAD BBPV WITH ZULAY       Date of Evaluation: 4/2/2024   MAN Gonsales is a 48 y.o. female developed dizziness.  Was evaluated by our audiologist and found to have right BPPV.  She had Epley maneuver on February 29, 2024.  She has some tinnitus.  Bilateral mild sensorineural hearing loss symmetric on audiogram.  She had an Epley maneuver and is feeling completely better       Past Medical History:   Diagnosis Date    Angina pectoris (CMS/HCC) 02/22/2024    Disorder of thyroid, unspecified     Thyroid trouble    Mixed hyperlipidemia 02/22/2024    Other conditions influencing health status     Right handed    Personal history of other diseases of the circulatory system     History of hypertension    Personal history of other specified conditions     History of shortness of breath    Primary hypertension 02/22/2024    Unspecified disorder of nose and nasal sinuses     Sinus trouble      No past surgical history on file.       Medications:   Current Outpatient Medications   Medication Instructions    albuterol 90 mcg/actuation inhaler INHALE 2 PUFFS INTO THE LUNGS EVERY 4 HOURS AS NEEDED for 30    azelastine (Astelin) 137 mcg (0.1 %) nasal spray nasal, Every 12 hours    benzonatate (Tessalon) 200 mg capsule 1 capsule, oral, 3 times daily PRN    biotin 1 mg capsule Biotin    cyclobenzaprine (Flexeril) 5 mg tablet oral, Every 24 hours    gemfibrozil (LOPID) 600 mg, oral, 2 times daily    HYDROcodone-acetaminophen (Norco) 5-325 mg tablet oral    levocetirizine (Xyzal) 5 mg tablet oral, Every 24 hours    levothyroxine (SYNTHROID, LEVOXYL) 100 mcg, oral, Daily before breakfast    metFORMIN XR (GLUCOPHAGE-XR) 500 mg, oral, Daily with evening meal, Do not crush, chew, or split.    metoprolol tartrate (LOPRESSOR) 12.5 mg, oral, 2 times daily    mometasone (Elocon) 0.1 % cream 1 Application     "omeprazole (PRILOSEC) 20 mg, oral, Daily    sertraline (ZOLOFT) 50 mg, oral    sertraline (ZOLOFT) 100 mg, oral, Daily        Allergies:  Allergies   Allergen Reactions    Penicillins Swelling and Unknown     Facial swelling        Physical Exam:  Last Recorded Vitals  Height 1.575 m (5' 2\"), weight 104 kg (229 lb), last menstrual period 04/01/2020.  []General appearance: Well-developed, well-nourished in no acute distress, conversant with normal voice quality    Head/face: No erythema or edema or facial tenderness, and normal facial nerve function bilaterally    External ear: Clear external auditory canals with normal pinnae  Tube status: N/A  Middle ear: Tympanic membranes intact and mobile, middle ears normal.  Tympanic membrane perforation: N/A  Mastoid bowl: N/A  Hearing: Normal conversational awareness at normal speech thresholds    Nose visualized using: Anterior rhinoscopy  Nasal dorsum: Nontraumatic midline appearance  Septum: Midline, nonobstructing  Inferior turbinates: Normal, pink  Secretions: Dry    Oral cavity and oropharynx: Normal  Teeth: Good condition  Floor of mouth: without lesions  Palate: Normal hard palate, soft palate and uvula  Oropharynx: Clear, no lesions present  Buccal mucosa: Normal without masses or lesions  Lips: Normal    Nasopharynx: Inadequate mirror exam secondary to gag/anatomy    Neck:  Salivary glands: Normal bilateral parotid and submandibular glands by inspection and palpation.  Non-thyroid masses: No palpable masses or significant lymphadenopathy  Trachea: Midline  Thyroid: No thyromegaly or palpable nodules  Temporomandibular joint: Nontender  Cervical range of motion: Normal    Neurologic exam: Alert and oriented x3, appropriate affect.  Cranial nerves II-XII normal bilaterally  Extraocular movement: Extraocular movement intact, normal gaze alignment        Shanti was seen today for dizziness.  Diagnoses and all orders for this visit:  Benign paroxysmal positional vertigo " of right ear (Primary)  Sensorineural hearing loss (SNHL) of both ears       PLAN  Doing well status post Epley.  Recheck as needed.  No need for hearing aids at this time.  Monitor tinnitus    Jose Koroma MD

## 2024-04-03 ENCOUNTER — OFFICE VISIT (OUTPATIENT)
Dept: CARDIOLOGY | Facility: CLINIC | Age: 49
End: 2024-04-03
Payer: COMMERCIAL

## 2024-04-03 VITALS
HEART RATE: 74 BPM | TEMPERATURE: 98.6 F | OXYGEN SATURATION: 98 % | RESPIRATION RATE: 18 BRPM | SYSTOLIC BLOOD PRESSURE: 126 MMHG | DIASTOLIC BLOOD PRESSURE: 74 MMHG | HEIGHT: 61 IN | WEIGHT: 229 LBS | BODY MASS INDEX: 43.23 KG/M2

## 2024-04-03 DIAGNOSIS — R94.31 ABNORMAL EKG: ICD-10-CM

## 2024-04-03 DIAGNOSIS — I20.9 ANGINA PECTORIS (CMS-HCC): Primary | ICD-10-CM

## 2024-04-03 DIAGNOSIS — I10 PRIMARY HYPERTENSION: ICD-10-CM

## 2024-04-03 DIAGNOSIS — E78.2 MIXED HYPERLIPIDEMIA: ICD-10-CM

## 2024-04-03 PROCEDURE — 3078F DIAST BP <80 MM HG: CPT | Performed by: INTERNAL MEDICINE

## 2024-04-03 PROCEDURE — 99214 OFFICE O/P EST MOD 30 MIN: CPT | Performed by: INTERNAL MEDICINE

## 2024-04-03 PROCEDURE — 1036F TOBACCO NON-USER: CPT | Performed by: INTERNAL MEDICINE

## 2024-04-03 PROCEDURE — 3074F SYST BP LT 130 MM HG: CPT | Performed by: INTERNAL MEDICINE

## 2024-04-03 ASSESSMENT — ENCOUNTER SYMPTOMS
OCCASIONAL FEELINGS OF UNSTEADINESS: 1
DEPRESSION: 0

## 2024-04-03 ASSESSMENT — PATIENT HEALTH QUESTIONNAIRE - PHQ9
1. LITTLE INTEREST OR PLEASURE IN DOING THINGS: NOT AT ALL
2. FEELING DOWN, DEPRESSED OR HOPELESS: NOT AT ALL
SUM OF ALL RESPONSES TO PHQ9 QUESTIONS 1 AND 2: 0

## 2024-04-03 ASSESSMENT — PAIN SCALES - GENERAL: PAINLEVEL: 0-NO PAIN

## 2024-04-03 ASSESSMENT — COLUMBIA-SUICIDE SEVERITY RATING SCALE - C-SSRS
2. HAVE YOU ACTUALLY HAD ANY THOUGHTS OF KILLING YOURSELF?: NO
1. IN THE PAST MONTH, HAVE YOU WISHED YOU WERE DEAD OR WISHED YOU COULD GO TO SLEEP AND NOT WAKE UP?: NO
6. HAVE YOU EVER DONE ANYTHING, STARTED TO DO ANYTHING, OR PREPARED TO DO ANYTHING TO END YOUR LIFE?: NO

## 2024-04-03 NOTE — PROGRESS NOTES
History of present illness:  48 year old female patient here today following up on hx of atypical chest pains and hot flashes. 2D Echo on 04/19/19 was normal with EF of 60-65% and Nuclear stress test in 09/2019 was normal with EF of 53%.  Patient underwent CT calcium score on September 2021 that showed 0 calcium in her arteries. Lung windows were okay.  Patient had ER evaluation in February 2024 for dizziness and vertigo.  At that time her EKG again showing ST depression in inferior leads with inferior Q waves similar to previous EKG.  Troponin were negative.  Patient returns to my office for follow-up.  Denies having chest pain palpitations dizziness nausea vomiting.  She does report shortness of breath with moderate activity.     Past Medical History:   Diagnosis Date    Angina pectoris (CMS/HCC) 02/22/2024    Disorder of thyroid, unspecified     Thyroid trouble    Mixed hyperlipidemia 02/22/2024    Other conditions influencing health status     Right handed    Personal history of other diseases of the circulatory system     History of hypertension    Personal history of other specified conditions     History of shortness of breath    Primary hypertension 02/22/2024    Unspecified disorder of nose and nasal sinuses     Sinus trouble       No past surgical history on file.    Allergies   Allergen Reactions    Penicillins Swelling and Unknown     Facial swelling        reports that she has never smoked. She has never used smokeless tobacco. She reports that she does not drink alcohol and does not use drugs.    Family History   Problem Relation Name Age of Onset    Breast cancer Mother  35    Lung cancer Father      COPD Father      Emphysema Father      Heart failure Father      Prostate cancer Father         Patient's Medications   New Prescriptions    No medications on file   Previous Medications    ALBUTEROL 90 MCG/ACTUATION INHALER    INHALE 2 PUFFS INTO THE LUNGS EVERY 4 HOURS AS NEEDED for 30    AZELASTINE  (ASTELIN) 137 MCG (0.1 %) NASAL SPRAY    Administer into affected nostril(s) every 12 hours.    BENZONATATE (TESSALON) 200 MG CAPSULE    Take 1 capsule (200 mg) by mouth 3 times a day as needed.    BIOTIN 1 MG CAPSULE    Biotin    CYCLOBENZAPRINE (FLEXERIL) 5 MG TABLET    Take by mouth once every 24 hours.    GEMFIBROZIL (LOPID) 600 MG TABLET    Take 1 tablet (600 mg) by mouth 2 times a day.    HYDROCODONE-ACETAMINOPHEN (NORCO) 5-325 MG TABLET    Take by mouth.    LEVOCETIRIZINE (XYZAL) 5 MG TABLET    Take by mouth once every 24 hours.    LEVOTHYROXINE (SYNTHROID, LEVOXYL) 100 MCG TABLET    TAKE 1 TABLET BY MOUTH EVERY DAY IN THE MORNING ON EMPTY STOMACH FOR 90 DAYS    METFORMIN XR (GLUCOPHAGE-XR) 500 MG 24 HR TABLET    Take 1 tablet (500 mg) by mouth once daily in the evening. Take with meals. Do not crush, chew, or split.    METOPROLOL TARTRATE (LOPRESSOR) 25 MG TABLET    TAKE 1/2 TABLET BY MOUTH TWICE A DAY    MOMETASONE (ELOCON) 0.1 % CREAM    1 Application.    OMEPRAZOLE (PRILOSEC) 20 MG DR CAPSULE    TAKE 1 CAPSULE BY MOUTH EVERY DAY FOR 90 DAYS    SERTRALINE (ZOLOFT) 100 MG TABLET    TAKE 1 TABLET BY MOUTH EVERY DAY FOR 90 DAYS    SERTRALINE (ZOLOFT) 50 MG TABLET    Take 1 tablet (50 mg) by mouth.   Modified Medications    No medications on file   Discontinued Medications    No medications on file       Objective   Physical Exam  General: Patient in no acute distress   HEENT: Atraumatic normocephalic.  Neck: Supple, jugular venous pressure within normal limit.  No bruits  Lungs: Clear to auscultation bilaterally  Cardiovascular: Regular rate and rhythm, normal heart sounds, no murmurs rubs or gallops  Abdomen: Soft nontender nondistended.  Normal bowel sounds.  Extremities: Warm to touch, no edema.      Lab Review   Admission on 02/21/2024, Discharged on 02/21/2024   Component Date Value    WBC 02/21/2024 8.8     nRBC 02/21/2024 0.0     RBC 02/21/2024 5.13     Hemoglobin 02/21/2024 13.5     Hematocrit  02/21/2024 42.0     MCV 02/21/2024 82     MCH 02/21/2024 26.3     MCHC 02/21/2024 32.1     RDW 02/21/2024 14.2     Platelets 02/21/2024 334     Neutrophils % 02/21/2024 57.7     Immature Granulocytes %,* 02/21/2024 1.1 (H)     Lymphocytes % 02/21/2024 34.3     Monocytes % 02/21/2024 6.1     Eosinophils % 02/21/2024 0.1     Basophils % 02/21/2024 0.7     Neutrophils Absolute 02/21/2024 5.09     Immature Granulocytes Ab* 02/21/2024 0.10     Lymphocytes Absolute 02/21/2024 3.03     Monocytes Absolute 02/21/2024 0.54     Eosinophils Absolute 02/21/2024 0.01     Basophils Absolute 02/21/2024 0.06     Glucose 02/21/2024 125 (H)     Sodium 02/21/2024 137     Potassium 02/21/2024 4.0     Chloride 02/21/2024 99     Bicarbonate 02/21/2024 25     Urea Nitrogen 02/21/2024 26 (H)     Creatinine 02/21/2024 1.30     eGFR 02/21/2024 50 (L)     Calcium 02/21/2024 9.7     Albumin 02/21/2024 4.6     Alkaline Phosphatase 02/21/2024 76     Total Protein 02/21/2024 8.3 (H)     AST 02/21/2024 16     Bilirubin, Total 02/21/2024 0.2     ALT 02/21/2024 16     Anion Gap 02/21/2024 13     Magnesium 02/21/2024 2.20     Ventricular Rate 02/21/2024 63     Atrial Rate 02/21/2024 63     LA Interval 02/21/2024 154     QRS Duration 02/21/2024 82     QT Interval 02/21/2024 442     QTC Calculation(Bazett) 02/21/2024 452     P Axis 02/21/2024 30     R Axis 02/21/2024 -7     T Axis 02/21/2024 -40     QRS Count 02/21/2024 10     Q Onset 02/21/2024 218     P Onset 02/21/2024 141     P Offset 02/21/2024 199     T Offset 02/21/2024 439     QTC Fredericia 02/21/2024 449     Ventricular Rate 02/21/2024 68     Atrial Rate 02/21/2024 68     LA Interval 02/21/2024 152     QRS Duration 02/21/2024 80     QT Interval 02/21/2024 414     QTC Calculation(Bazett) 02/21/2024 440     P Axis 02/21/2024 30     R Axis 02/21/2024 -2     T Axis 02/21/2024 -44     QRS Count 02/21/2024 11     Q Onset 02/21/2024 220     P Onset 02/21/2024 144     P Offset 02/21/2024 197     T  Offset 02/21/2024 427     QTC Fredericia 02/21/2024 431     Flu A Result 02/21/2024 Not Detected     Flu B Result 02/21/2024 Not Detected     Coronavirus 2019, PCR 02/21/2024 Not Detected     RSV PCR 02/21/2024 Not Detected     Troponin T, High Sensiti* 02/21/2024 8     Color, Urine 02/21/2024 Light-Yellow     Appearance, Urine 02/21/2024 Turbid (N)     Specific Gravity, Urine 02/21/2024 1.023     pH, Urine 02/21/2024 5.5     Protein, Urine 02/21/2024 10 (TRACE)     Glucose, Urine 02/21/2024 Normal     Blood, Urine 02/21/2024 0.03 (TRACE) (A)     Ketones, Urine 02/21/2024 NEGATIVE     Bilirubin, Urine 02/21/2024 NEGATIVE     Urobilinogen, Urine 02/21/2024 Normal     Nitrite, Urine 02/21/2024 NEGATIVE     Leukocyte Esterase, Urine 02/21/2024 500 Christopher/µL (A)     WBC, Urine 02/21/2024 >50 (A)     RBC, Urine 02/21/2024 11-20 (A)     Squamous Epithelial Cell* 02/21/2024 1-9 (SPARSE)     Bacteria, Urine 02/21/2024 1+ (A)     Mucus, Urine 02/21/2024 FEW     Urine Culture 02/21/2024 Normal genitourinary tushar    Lab on 02/09/2024   Component Date Value    Cholesterol 02/09/2024 218 (H)     HDL-Cholesterol 02/09/2024 39.0 (L)     Cholesterol/HDL Ratio 02/09/2024 5.6     LDL Calculated 02/09/2024 120     Triglycerides 02/09/2024 293 (H)     Glucose 02/09/2024 99     Sodium 02/09/2024 140     Potassium 02/09/2024 4.7     Chloride 02/09/2024 102     Bicarbonate 02/09/2024 26     Urea Nitrogen 02/09/2024 20     Creatinine 02/09/2024 0.90     eGFR 02/09/2024 78     Calcium 02/09/2024 9.5     Albumin 02/09/2024 4.3     Alkaline Phosphatase 02/09/2024 86     Total Protein 02/09/2024 7.2     AST 02/09/2024 17     Bilirubin, Total 02/09/2024 0.4     ALT 02/09/2024 19     Anion Gap 02/09/2024 12     WBC 02/09/2024 10.5     nRBC 02/09/2024 0.0     RBC 02/09/2024 5.08     Hemoglobin 02/09/2024 13.3     Hematocrit 02/09/2024 41.6     MCV 02/09/2024 82     MCH 02/09/2024 26.2     MCHC 02/09/2024 32.0     RDW 02/09/2024 14.3     Platelets  02/09/2024 335     Thyroid Stimulating Horm* 02/09/2024 2.63     Hemoglobin A1C 02/09/2024 6.4 (H)     Estimated Average Glucose 02/09/2024 137     Color, Urine 02/09/2024 Light-Yellow     Appearance, Urine 02/09/2024 Turbid (N)     Specific Gravity, Urine 02/09/2024 1.024     pH, Urine 02/09/2024 5.0     Protein, Urine 02/09/2024 10 (TRACE)     Glucose, Urine 02/09/2024 Normal     Blood, Urine 02/09/2024 0.03 (TRACE) (A)     Ketones, Urine 02/09/2024 NEGATIVE     Bilirubin, Urine 02/09/2024 NEGATIVE     Urobilinogen, Urine 02/09/2024 Normal     Nitrite, Urine 02/09/2024 NEGATIVE     Leukocyte Esterase, Urine 02/09/2024 500 Christopher/µL (A)     Extra Tube 02/09/2024 Hold for add-ons.     WBC, Urine 02/09/2024 21-50 (A)     RBC, Urine 02/09/2024 11-20 (A)     Squamous Epithelial Cell* 02/09/2024 10-25 (FEW)     Transitional Epithelial * 02/09/2024 3-5 (1+)     Bacteria, Urine 02/09/2024 2+ (A)     Mucus, Urine 02/09/2024 FEW     Urine Culture 02/09/2024 No growth         Assessment/Plan   Patient Active Problem List   Diagnosis    Acute back pain with sciatica    Acute upper respiratory infection    Angina pectoris (CMS/HCC)    Anxiety    Carpal tunnel syndrome of left wrist    Cough, unspecified    Cystitis    Dizziness and giddiness    Eczema    Gastroesophageal reflux disease    Hand pain    Hypothyroidism    Kidney stone    Lightheadedness    Mixed hyperlipidemia    Obstructive sleep apnea syndrome    Otitis externa    Pain, unspecified    Primary hypertension    Rheumatoid arthritis (CMS/HCC)    Seasonal allergic rhinitis    Shortness of breath    Venous thrombosis    Prediabetes    Vertigo        48 year old female patient here today following up on hx of atypical chest pains and hot flashes. 2D Echo on 04/19/19 was normal with EF of 60-65% and Nuclear stress test in 09/2019 was normal with EF of 53%.  Patient underwent CT calcium score on September 2021 that showed 0 calcium in her arteries. Lung windows were okay.   Patient had ER evaluation in February 2024 for dizziness and vertigo.  At that time her EKG again showing ST depression in inferior leads with inferior Q waves similar to previous EKG.  Troponin were negative.  Patient returns to my office for follow-up.  Denies having chest pain palpitations dizziness nausea vomiting.  She does report shortness of breath with moderate activity.  At this point I am not concerned about coronary disease in her case but I am worried about this and EKG abnormalities that she may have LVH.  We will repeat another 2D echo to assess ejection fraction muscle thickening and valves.  Otherwise she is on optimal medical therapy continue metoprolol.  LDL 120s no indication for statin for the time being we will consider that after few years.  Will follow-up in 6 months.  Sebastian Cintron MD

## 2024-04-05 DIAGNOSIS — Z23 NEED FOR SHINGLES VACCINE: ICD-10-CM

## 2024-04-09 ENCOUNTER — CLINICAL SUPPORT (OUTPATIENT)
Dept: PRIMARY CARE | Facility: CLINIC | Age: 49
End: 2024-04-09
Payer: COMMERCIAL

## 2024-04-09 ENCOUNTER — APPOINTMENT (OUTPATIENT)
Dept: PHYSICAL THERAPY | Facility: CLINIC | Age: 49
End: 2024-04-09
Payer: COMMERCIAL

## 2024-04-09 DIAGNOSIS — Z23 NEED FOR SHINGLES VACCINE: ICD-10-CM

## 2024-04-11 ENCOUNTER — CLINICAL SUPPORT (OUTPATIENT)
Dept: PRIMARY CARE | Facility: CLINIC | Age: 49
End: 2024-04-11
Payer: COMMERCIAL

## 2024-04-11 DIAGNOSIS — Z23 NEED FOR VACCINATION: ICD-10-CM

## 2024-04-11 PROCEDURE — 90750 HZV VACC RECOMBINANT IM: CPT | Performed by: FAMILY MEDICINE

## 2024-04-18 ENCOUNTER — HOSPITAL ENCOUNTER (OUTPATIENT)
Dept: CARDIOLOGY | Facility: HOSPITAL | Age: 49
Discharge: HOME | End: 2024-04-18
Payer: COMMERCIAL

## 2024-04-18 DIAGNOSIS — I20.9 ANGINA PECTORIS (CMS-HCC): ICD-10-CM

## 2024-04-18 DIAGNOSIS — I10 PRIMARY HYPERTENSION: ICD-10-CM

## 2024-04-18 DIAGNOSIS — E78.2 MIXED HYPERLIPIDEMIA: ICD-10-CM

## 2024-04-18 DIAGNOSIS — R94.31 ABNORMAL EKG: ICD-10-CM

## 2024-04-18 PROCEDURE — 93306 TTE W/DOPPLER COMPLETE: CPT | Performed by: INTERNAL MEDICINE

## 2024-04-18 PROCEDURE — 93306 TTE W/DOPPLER COMPLETE: CPT

## 2024-04-19 LAB
AORTIC VALVE PEAK VELOCITY: 1.28 M/S
AV PEAK GRADIENT: 6.6 MMHG
AVA (PEAK VEL): 1.46 CM2
EJECTION FRACTION APICAL 4 CHAMBER: 58
LEFT ATRIUM VOLUME AREA LENGTH INDEX BSA: 18.3 ML/M2
LEFT VENTRICLE INTERNAL DIMENSION DIASTOLE: 5.72 CM (ref 3.5–6)
LEFT VENTRICULAR OUTFLOW TRACT DIAMETER: 2 CM
LV EJECTION FRACTION BIPLANE: 53 %
MITRAL VALVE E/A RATIO: 1.26
MITRAL VALVE E/E' RATIO: 12.52
RIGHT VENTRICLE FREE WALL PEAK S': 12.9 CM/S
RIGHT VENTRICLE PEAK SYSTOLIC PRESSURE: 23.1 MMHG

## 2024-04-23 ENCOUNTER — DOCUMENTATION (OUTPATIENT)
Dept: PHYSICAL THERAPY | Facility: CLINIC | Age: 49
End: 2024-04-23

## 2024-04-23 ENCOUNTER — APPOINTMENT (OUTPATIENT)
Dept: PHYSICAL THERAPY | Facility: CLINIC | Age: 49
End: 2024-04-23
Payer: COMMERCIAL

## 2024-04-23 NOTE — PROGRESS NOTES
Physical Therapy                 Therapy Communication Note    Patient Name: Shanti Gonsales  MRN: [id number]  Today's Date: 4/23/2024     Discipline: Physical Therapy    Missed Visit Reason:  Patient cancelled via MyChart, no reason given.    Missed Time: Cancel    Comment:

## 2024-04-25 ENCOUNTER — TELEPHONE (OUTPATIENT)
Dept: CARDIOLOGY | Facility: CLINIC | Age: 49
End: 2024-04-25
Payer: COMMERCIAL

## 2024-04-25 NOTE — TELEPHONE ENCOUNTER
----- Message from Sebastian Cintron MD sent at 4/24/2024  4:22 PM EDT -----  Tell patient that her echo was okay.  Let me see her as scheduled  ----- Message -----  From: Verenice Nuñezo - Cardiology Results In  Sent: 4/19/2024   1:55 PM EDT  To: Sebastian Cintorn MD

## 2024-04-29 ENCOUNTER — TELEMEDICINE CLINICAL SUPPORT (OUTPATIENT)
Dept: NUTRITION | Facility: CLINIC | Age: 49
End: 2024-04-29
Payer: COMMERCIAL

## 2024-04-29 VITALS — HEIGHT: 61 IN | WEIGHT: 221 LBS | BODY MASS INDEX: 41.72 KG/M2

## 2024-04-29 DIAGNOSIS — R73.03 PREDIABETES: ICD-10-CM

## 2024-04-29 PROCEDURE — 97802 MEDICAL NUTRITION INDIV IN: CPT | Mod: 95 | Performed by: FAMILY MEDICINE

## 2024-04-29 NOTE — PATIENT INSTRUCTIONS
Nutrition Education Material: AND: 20 Ways to Enjoy More Fruits and Vegetables and Lifestyle Tips for Blood Sugar Control  Provided patient with my office phone # and email address for any further questions.

## 2024-04-29 NOTE — PROGRESS NOTES
"Nutrition Assessment     Reason for Visit:  Shanti Gonsales is a 48 y.o. female who presents for Prediabetes.  Pt scheduled for a virtual appointment. Identification was verified with 2 sources of identification. Patient was in [place] at time of visit.  HIPAA protocol was maintained.     Anthropometrics:       Significant Past Medical Hx:  HLD, GERD, Hypothyroid    Biochemical/Laboratory Data:  Lab Results   Component Value Date    HGBA1C 6.4 (H) 02/09/2024    CHOL 218 (H) 02/09/2024    TRIG 293 (H) 02/09/2024      Pertinent Medications:  recently started on Metformin    Food And Nutrient Intake:  Food and Nutrient History  Food and Nutrient History: Met w/ pt to obtain diet hx and instruct on diet guidelines for DM diet d/t recent diagnosis of pre-DM.  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  She ofen eats meals out. Diet high in processed foods with limited intake of fruits and vegs.  She has switched from regular coke to coke zero since dx.  Fluid Intake: water, coke zero or diet lemonade  Food Allergy: none  Food Intolerance: none     Food Intake  Meal 1: stops on way to work for [employer] Sausage McMuffin sandwich and coke zero  OR pardeep donuts avocado toast  Meal 2: out: fajitas at mexican restaurant OR 6\" turkey sub w/ chips OR chick-judson-a chicken sandwich  Meal 3: eaten out or at group home:  >1 C spaghetti, maybe salad  Snacks: ice cream, nino chip cookies    Food Preparation  Cooking: Family, Patient  Grocery Shopping: Patient, Family  Dining Out: Everyday  Grocery Shopping Schedule: Has no shopping plan - goes as needed.   Convenience/Processed Foods: Prepared Foods from Grocery Store, Processed/Frozen Convenience Meals, Processed Salty Snacks, and Processed Sweet Snacks  daily  Cooking Skills/Barriers: Time constraints  Meal Preparation Habits: Has cooking skills, Has cooking skills, but does little cooking, and Little preplanning of meals  Eating Out " "Type: Lunch, Fast Food, and Restaurant   General Food Category Consumption Habits: Rarely eats fruit, May have a vegetable 1x/d, Tends to have meals with larger portions of grains/starches, Tends to eat higher fat meats, and Generally consumes some type of 'sweet' daily                                                                Food And Nutrient Administration:                        Factors:                         Physical Activities:  Physical Activity  Physical Activity History: no planned physical activity - reports busy schedule prohibits exericse, but she walk around with clients and 'moves' during the day           Knowledge Beliefs Attitudes and Behavior                                       Nutrition Focused Physical Exam:           Energy Needs  Calculated Energy Needs Using Equations  Height: 154.9 cm (5' 1\")        Nutrition Diagnosis      Nutrition Diagnosis  Patient has Nutrition Diagnosis: Yes  Diagnosis Status (1): New  Nutrition Diagnosis 1: Obese  Related to (1): energy intake in excess of energy expenditure  As Evidenced by (1): diet hx and BMI    Nutrition Interventions/Recommendations   Food and Nutrition Delivery  Meals & Snacks: Carbohydrate-modified diet, General Healthful Diet, Energy-modified diet  Nutrition Education  Nutrition Education Content: Content related nutrition education, Education on nutrition's influence on health  Goals: 1)  Will avoid fast food for breakfast and prepare healthier option at home (ideas discussed)  2)  Will cut back on eating out and do more preplanning of meals  3) Will avoid sweetened beverages        There are no Patient Instructions on file for this visit.    Nutrition Monitoring and Evaluation                                     "

## 2024-04-30 ENCOUNTER — DOCUMENTATION (OUTPATIENT)
Dept: PHYSICAL THERAPY | Facility: CLINIC | Age: 49
End: 2024-04-30
Payer: COMMERCIAL

## 2024-04-30 ENCOUNTER — APPOINTMENT (OUTPATIENT)
Dept: PHYSICAL THERAPY | Facility: CLINIC | Age: 49
End: 2024-04-30
Payer: COMMERCIAL

## 2024-04-30 NOTE — PROGRESS NOTES
Physical Therapy                 Therapy Communication Note    Patient Name: Shanti Gonsales  MRN: [id number]  Today's Date: 4/30/2024     Discipline: Physical Therapy    Missed Visit Reason:  no reason given    Missed Time: Cancel    Comment: Patient no show/cancel 3x in a row, per department policy, patient will be taken off of schedule. Will call patient and inform her, will notify evaluating therapist.

## 2024-05-07 ENCOUNTER — APPOINTMENT (OUTPATIENT)
Dept: PHYSICAL THERAPY | Facility: CLINIC | Age: 49
End: 2024-05-07
Payer: COMMERCIAL

## 2024-05-10 ENCOUNTER — HOSPITAL ENCOUNTER (OUTPATIENT)
Dept: RADIOLOGY | Facility: EXTERNAL LOCATION | Age: 49
Discharge: HOME | End: 2024-05-10
Payer: COMMERCIAL

## 2024-05-10 DIAGNOSIS — S49.92XA SHOULDER INJURY, LEFT, INITIAL ENCOUNTER: ICD-10-CM

## 2024-05-10 DIAGNOSIS — S99.912A ANKLE INJURY, LEFT, INITIAL ENCOUNTER: ICD-10-CM

## 2024-05-13 PROCEDURE — 88175 CYTOPATH C/V AUTO FLUID REDO: CPT

## 2024-05-13 PROCEDURE — 87624 HPV HI-RISK TYP POOLED RSLT: CPT

## 2024-05-14 ENCOUNTER — APPOINTMENT (OUTPATIENT)
Dept: PHYSICAL THERAPY | Facility: CLINIC | Age: 49
End: 2024-05-14
Payer: COMMERCIAL

## 2024-05-15 ENCOUNTER — LAB REQUISITION (OUTPATIENT)
Dept: LAB | Facility: HOSPITAL | Age: 49
End: 2024-05-15
Payer: COMMERCIAL

## 2024-05-15 DIAGNOSIS — Z01.419 ENCOUNTER FOR GYNECOLOGICAL EXAMINATION (GENERAL) (ROUTINE) WITHOUT ABNORMAL FINDINGS: ICD-10-CM

## 2024-05-15 DIAGNOSIS — Z11.51 ENCOUNTER FOR SCREENING FOR HUMAN PAPILLOMAVIRUS (HPV): ICD-10-CM

## 2024-05-15 DIAGNOSIS — Z12.4 ENCOUNTER FOR SCREENING FOR MALIGNANT NEOPLASM OF CERVIX: ICD-10-CM

## 2024-05-22 ENCOUNTER — DOCUMENTATION (OUTPATIENT)
Dept: PHYSICAL THERAPY | Facility: CLINIC | Age: 49
End: 2024-05-22
Payer: COMMERCIAL

## 2024-05-22 NOTE — PROGRESS NOTES
Physical Therapy    Discharge Summary    Name: Shanti Gonsales  MRN: [id number]  : 1975  Date: 24    Discharge Summary: PT    Discharge Information: Date of discharge 24, Date of last visit 24, Date of evaluation 3/12/24, and Number of attended visits 2    Therapy Summary: Pt only attended one follow up session, reported at last session that she was no longer having any dizziness. Pt failed to attend remaining of appointments and was removed from schedule. Discharge at this time    Discharge Status: self-discharge     Rehab Discharge Reason: Failed to schedule and/or keep follow-up appointment(s)

## 2024-05-23 ENCOUNTER — APPOINTMENT (OUTPATIENT)
Dept: OTOLARYNGOLOGY | Facility: CLINIC | Age: 49
End: 2024-05-23
Payer: COMMERCIAL

## 2024-06-06 ENCOUNTER — CLINICAL SUPPORT (OUTPATIENT)
Dept: AUDIOLOGY | Facility: CLINIC | Age: 49
End: 2024-06-06
Payer: COMMERCIAL

## 2024-06-06 DIAGNOSIS — R42 DIZZINESS: Primary | ICD-10-CM

## 2024-06-06 PROCEDURE — 92542 POSITIONAL NYSTAGMUS TEST: CPT | Performed by: AUDIOLOGIST

## 2024-06-06 NOTE — LETTER
"     EVALUATION OF BENIGN POSITIONAL PAROXYSMAL VERTIGO BPPV    HISTORY:  Patient reports that 4 days ago, on Sunday, she opened her eyes in morning and the room was spinnning; even with eyes closed she could \"feel them moving\"; she felt spinning dizziness for about 5 to 10 minutes.    She was nauseated.    She felt off balanced throughout the day and needed to hold on to the walls.  She  was unable to attend her father's \"celebration of life\" that day; Sunday.   Monday she wasn't spinning, continued to feel \"off\" balanced; did not go to work.   On Tuesday she tried going to work and would get episodes of spinning dizziness.    She continues to feel \"off\".      Patient has history of a significant attack of spinning dizziness, vomiting, lost balance back in 2/21/24.   Patient was seen here 2/29/24 for audiogram; non-classic right sided BPPV was addressed and resolved post repositioning.       EVALUATION: HALLPIKE HEAD RIGHT demonstrated just a brief start of BPPV with patient report of dizziness; not classic.    Canalith repositioning performed    Waited about ten minutes; repeat Hallpike head right demonstrated complete resolution of nystagmus and dizziness; repositioned once more.       Discussed the pathophysiology of BPPV.   Also discussed at length the difference between dizziness \"coming out of no where\"; lasting minutes, hours versus BPPV.  Recommended for a few days that the patient be careful with head and body movement that would normally trigger the dizziness.  Patient may feel a little \"off\" the next few days post repositioning.     RECOMMENDATIONS:   Due to recent return of significant imbalance, nausea, dizziness.   Will consult with Jose Koroma MD; consider further evaluation,  Videonystagmography  Patient will be contacted with recommendations.      Jeanette Mistry M.A., CCC/A     "

## 2024-06-19 ENCOUNTER — APPOINTMENT (OUTPATIENT)
Dept: AUDIOLOGY | Facility: CLINIC | Age: 49
End: 2024-06-19
Payer: COMMERCIAL

## 2024-06-19 DIAGNOSIS — R42 DIZZINESS: ICD-10-CM

## 2024-06-19 PROCEDURE — 92540 BASIC VESTIBULAR EVALUATION: CPT | Performed by: AUDIOLOGIST

## 2024-06-19 PROCEDURE — 92537 CALORIC VSTBLR TEST W/REC: CPT | Performed by: AUDIOLOGIST

## 2024-06-19 ASSESSMENT — PAIN - FUNCTIONAL ASSESSMENT: PAIN_FUNCTIONAL_ASSESSMENT: 0-10

## 2024-06-19 ASSESSMENT — PAIN SCALES - GENERAL: PAINLEVEL_OUTOF10: 0 - NO PAIN

## 2024-06-19 ASSESSMENT — ENCOUNTER SYMPTOMS: OCCASIONAL FEELINGS OF UNSTEADINESS: 0

## 2024-06-19 NOTE — LETTER
Hello! Would you consider placing an order for vestibular therapy for this patient? VNG suggests atypical BPPV. Oculomotor and calorics are within normal limits. Also, when would you like to see her in follow-up? Thanks! ________________________________________________________________________________________________________      AUDIOLOGY  VIDEONYSTAGMOGRAPHY (VNG) EVALUATION    Name:  Shanti Gonsales  :  1975  Age:  48 y.o.  Date of Evaluation:  2024    Reason for visit: Ms. Gonsales is seen in the clinic today at the request of Jose Koroma MD in otolaryngology for a videonystagmography (VNG) evaluation.    HISTORY  Patient reports that in 2024 she had a sudden attack of room spinning dizziness, imbalance, and vomiting lasting several hours.  She was taken via ambulance to the emergency department with a negative workup. She had a second similar episode in 2024 lasting for only a few minutes; however, the imbalance persisted for a couple of days longer. She has been feeling okay recently. Case history was obtained from the patient.    Patient was evaluated and treated for Benign Paroxysmal Positional Vertigo (BPPV) in 2024 and in 2024; atypical BPPV was diagnosed in the right ear and the Epley maneuver was performed each time which yielded some improvement.    Audiogram dated 2024 revealed a mild sensorineural hearing loss from 6000 Hz to 8000 Hz in the right ear, and normal hearing sensitivity in the left ear. Word recognition ability was excellent bilaterally.     SCREENINGS  Steadi Fall Risk  One or more falls in the last year? Yes  How many Times? 1  Was the patient injured in the fall? Yes  Has trouble stepping onto curb?    Feels unsteady when walking? No  Worried about Falling? Yes    Domestic Violence Screening  Are you currently or have you recently been threatened or abused physically, emotionally, or sexually by anyone? No  Do you feel UNSAFE going back  "to the place you are living? No    Pain Assessment  Pain Assessment: 0-10  Pain Score: 0 - No pain    EVALUATION  See scanned results under “Media”  \"Visit Notes”  Document ID [id number].    RESULTS  Patient reported compliance with all pre-test conditions. Ocular motor testing to visually guided targets was conducted using a dual channel video-recording technique for the recording of eye movement in the horizontal and vertical planes. Bithermal air caloric testing was performed at 48 degrees Celsius and 24 degrees Celsius.    Otoscopy: Normal. Clear ear canals with visualization of the tympanic membrane bilaterally.  Calibration: Normal. Adequate.    Random Saccades: Normal. Latencies, velocities, and accuracies are within normal limits.  Gaze: Normal. Normal gaze to left, right, up, down, and center targets.  Smooth Pursuit: Normal. Gain and symmetry are within normal limits.  Optokinetic (OPKs): Normal. Gain and symmetry are within normal limits.  Spontaneous Nystagmus: Normal. No spontaneous nystagmus observed/recorded in sitting or supine positions.    Ernesto-Hallpike: Abnormal. No observed/recorded nystagmus during head right or head left maneuver; however, upon sitting up from head right maneuver significant nystagmus was observed/recorded.  Positional: Abnormal. Significant nystagmus observed/recovered in head right position. No observed/recorded nystagmus in sitting, supine, or head left positions.    Bithermal Caloric: Well-formed nystagmus was recorded with each irrigation.  Right warm: -21 degrees per second  Left warm: 13 degrees per second  Right cool: 15 degrees per second  Left cool: -15 degrees per second  Unilateral weakness: Normal. 12% weaker in the left ear is not significant.  Directional preponderance: Normal. 12% right beating stronger is not significant.  Fixation suppression: Normal. No failure to fixate.    Video Head Impulse Test (vHIT): Not performed today.    IMPRESSIONS  Abnormal " Videonystagmography (VNG) evaluation. There is no evidence of central vestibular system disorder. There is evidence of peripheral or central vestibular system disorder.    Bithermal caloric testing yielded symmetrical responses, and the directional preponderance is not significant. Oculomotor testing to visually guided signals was determined to be normal. 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     RECOMMENDATIONS  - Follow up with Jose Koroma MD in otolaryngology.  - Consider specialized vestibular therapy; specifically Clemons-and-Yaw maneuver versus Epley maneuver.   - Follow-up with medical care team as planned.     PATIENT EDUCATION  Discussed results, impressions and recommendations with the patient. Questions were addressed and the patient was encouraged to contact our office should concerns arise.     Time for this encounter: 900-1030     Esther Cleaning, CCC-A  Licensed Audiologist

## 2024-06-19 NOTE — PROGRESS NOTES
"  AUDIOLOGY  VIDEONYSTAGMOGRAPHY (VNG) EVALUATION    Name:  Shanti Gonsales  :  1975  Age:  48 y.o.  Date of Evaluation:  2024    Reason for visit: Ms. Gonsales is seen in the clinic today at the request of Jose Koroma MD in otolaryngology for a videonystagmography (VNG) evaluation.    HISTORY  Patient reports that in 2024 she had a sudden attack of room spinning dizziness, imbalance, and vomiting lasting several hours.  She was taken via ambulance to the emergency department with a negative workup. She had a second similar episode in 2024 lasting for only a few minutes; however, the imbalance persisted for a couple of days longer. She has been feeling okay recently. Case history was obtained from the patient.    Patient was evaluated and treated for Benign Paroxysmal Positional Vertigo (BPPV) in 2024 and in 2024; atypical BPPV was diagnosed in the right ear and the Epley maneuver was performed each time which yielded some improvement.    Audiogram dated 2024 revealed a mild sensorineural hearing loss from 6000 Hz to 8000 Hz in the right ear, and normal hearing sensitivity in the left ear. Word recognition ability was excellent bilaterally.     SCREENINGS  Steadi Fall Risk  One or more falls in the last year? Yes  How many Times? 1  Was the patient injured in the fall? Yes  Has trouble stepping onto curb?    Feels unsteady when walking? No  Worried about Falling? Yes    Domestic Violence Screening  Are you currently or have you recently been threatened or abused physically, emotionally, or sexually by anyone? No  Do you feel UNSAFE going back to the place you are living? No    Pain Assessment  Pain Assessment: 0-10  Pain Score: 0 - No pain    EVALUATION  See scanned results under “Media”  \"Visit Notes”  Document ID [id number].    RESULTS  Patient reported compliance with all pre-test conditions. Ocular motor testing to visually guided targets was conducted using a " dual channel video-recording technique for the recording of eye movement in the horizontal and vertical planes. Bithermal air caloric testing was performed at 48 degrees Celsius and 24 degrees Celsius.    Otoscopy: Normal. Clear ear canals with visualization of the tympanic membrane bilaterally.  Calibration: Normal. Adequate.    Random Saccades: Normal. Latencies, velocities, and accuracies are within normal limits.  Gaze: Normal. Normal gaze to left, right, up, down, and center targets.  Smooth Pursuit: Normal. Gain and symmetry are within normal limits.  Optokinetic (OPKs): Normal. Gain and symmetry are within normal limits.  Spontaneous Nystagmus: Normal. No spontaneous nystagmus observed/recorded in sitting or supine positions.    Bud-Hallpike: Abnormal. No observed/recorded nystagmus during head right or head left maneuver; however, upon sitting up from head right maneuver significant nystagmus was observed/recorded.  Positional: Abnormal. Significant nystagmus observed/recovered in head right position. No observed/recorded nystagmus in sitting, supine, or head left positions.    Bithermal Caloric: Well-formed nystagmus was recorded with each irrigation.  Right warm: -21 degrees per second  Left warm: 13 degrees per second  Right cool: 15 degrees per second  Left cool: -15 degrees per second  Unilateral weakness: Normal. 12% weaker in the left ear is not significant.  Directional preponderance: Normal. 12% right beating stronger is not significant.  Fixation suppression: Normal. No failure to fixate.    Video Head Impulse Test (vHIT): Not performed today.    IMPRESSIONS  Abnormal Videonystagmography (VNG) evaluation. There is no evidence of central vestibular system disorder. There is evidence of peripheral or central vestibular system disorder.    Bithermal caloric testing yielded symmetrical responses, and the directional preponderance is not significant. Oculomotor testing to visually guided signals was  determined to be normal. 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     RECOMMENDATIONS  - Follow up with Jose Koroma MD in otolaryngology.  - Consider specialized vestibular therapy; specifically Strong-and-Yaw maneuver versus Epley maneuver.   - Follow-up with medical care team as planned.     PATIENT EDUCATION  Discussed results, impressions and recommendations with the patient. Questions were addressed and the patient was encouraged to contact our office should concerns arise.     Time for this encounter: [phone]     Esther Cleaning, CCC-A  Licensed Audiologist

## 2024-06-21 NOTE — PROGRESS NOTES
I spoke with Shanti and she has an appt today with PT. She will call for fu appt with Dr. Koroma after she gets an idea of time frame from them.

## 2024-06-25 ENCOUNTER — LAB (OUTPATIENT)
Dept: LAB | Facility: LAB | Age: 49
End: 2024-06-25
Payer: COMMERCIAL

## 2024-06-25 DIAGNOSIS — R73.03 PREDIABETES: ICD-10-CM

## 2024-06-25 LAB
ANION GAP SERPL CALC-SCNC: 12 MMOL/L
BUN SERPL-MCNC: 21 MG/DL (ref 8–25)
CALCIUM SERPL-MCNC: 9.8 MG/DL (ref 8.5–10.4)
CHLORIDE SERPL-SCNC: 101 MMOL/L (ref 97–107)
CO2 SERPL-SCNC: 27 MMOL/L (ref 24–31)
CREAT SERPL-MCNC: 1.1 MG/DL (ref 0.4–1.6)
EGFRCR SERPLBLD CKD-EPI 2021: 61 ML/MIN/1.73M*2
EST. AVERAGE GLUCOSE BLD GHB EST-MCNC: 117 MG/DL
GLUCOSE SERPL-MCNC: 93 MG/DL (ref 65–99)
HBA1C MFR BLD: 5.7 %
POTASSIUM SERPL-SCNC: 4.5 MMOL/L (ref 3.4–5.1)
SODIUM SERPL-SCNC: 140 MMOL/L (ref 133–145)

## 2024-06-25 PROCEDURE — 36415 COLL VENOUS BLD VENIPUNCTURE: CPT

## 2024-06-25 PROCEDURE — 80048 BASIC METABOLIC PNL TOTAL CA: CPT

## 2024-06-25 PROCEDURE — 83036 HEMOGLOBIN GLYCOSYLATED A1C: CPT

## 2024-06-28 RX ORDER — SOD SULF/POT CHLORIDE/MAG SULF 1.479 G
TABLET ORAL
COMMUNITY
Start: 2024-05-03

## 2024-07-01 ENCOUNTER — OFFICE VISIT (OUTPATIENT)
Dept: PRIMARY CARE | Facility: CLINIC | Age: 49
End: 2024-07-01
Payer: COMMERCIAL

## 2024-07-01 VITALS
DIASTOLIC BLOOD PRESSURE: 84 MMHG | TEMPERATURE: 96 F | WEIGHT: 211.8 LBS | HEIGHT: 61 IN | BODY MASS INDEX: 39.99 KG/M2 | SYSTOLIC BLOOD PRESSURE: 110 MMHG | RESPIRATION RATE: 18 BRPM | OXYGEN SATURATION: 92 % | HEART RATE: 74 BPM

## 2024-07-01 DIAGNOSIS — I10 ESSENTIAL (PRIMARY) HYPERTENSION: ICD-10-CM

## 2024-07-01 DIAGNOSIS — R42 DIZZINESS AND GIDDINESS: Primary | ICD-10-CM

## 2024-07-01 PROCEDURE — 3079F DIAST BP 80-89 MM HG: CPT | Performed by: FAMILY MEDICINE

## 2024-07-01 PROCEDURE — 3074F SYST BP LT 130 MM HG: CPT | Performed by: FAMILY MEDICINE

## 2024-07-01 PROCEDURE — 99214 OFFICE O/P EST MOD 30 MIN: CPT | Performed by: FAMILY MEDICINE

## 2024-07-01 RX ORDER — METOPROLOL TARTRATE 25 MG/1
25 TABLET, FILM COATED ORAL 2 TIMES DAILY
Qty: 90 TABLET | Refills: 3 | Status: SHIPPED | OUTPATIENT
Start: 2024-07-01 | End: 2025-07-01

## 2024-07-01 ASSESSMENT — PAIN SCALES - GENERAL: PAINLEVEL: 0-NO PAIN

## 2024-07-01 ASSESSMENT — PATIENT HEALTH QUESTIONNAIRE - PHQ9
7. TROUBLE CONCENTRATING ON THINGS, SUCH AS READING THE NEWSPAPER OR WATCHING TELEVISION: NOT AT ALL
5. POOR APPETITE OR OVEREATING: SEVERAL DAYS
6. FEELING BAD ABOUT YOURSELF - OR THAT YOU ARE A FAILURE OR HAVE LET YOURSELF OR YOUR FAMILY DOWN: NOT AT ALL
3. TROUBLE FALLING OR STAYING ASLEEP OR SLEEPING TOO MUCH: SEVERAL DAYS
2. FEELING DOWN, DEPRESSED OR HOPELESS: NEARLY EVERY DAY
1. LITTLE INTEREST OR PLEASURE IN DOING THINGS: NEARLY EVERY DAY
8. MOVING OR SPEAKING SO SLOWLY THAT OTHER PEOPLE COULD HAVE NOTICED. OR THE OPPOSITE, BEING SO FIGETY OR RESTLESS THAT YOU HAVE BEEN MOVING AROUND A LOT MORE THAN USUAL: NOT AT ALL
9. THOUGHTS THAT YOU WOULD BE BETTER OFF DEAD, OR OF HURTING YOURSELF: NOT AT ALL
10. IF YOU CHECKED OFF ANY PROBLEMS, HOW DIFFICULT HAVE THESE PROBLEMS MADE IT FOR YOU TO DO YOUR WORK, TAKE CARE OF THINGS AT HOME, OR GET ALONG WITH OTHER PEOPLE: SOMEWHAT DIFFICULT
SUM OF ALL RESPONSES TO PHQ9 QUESTIONS 1 AND 2: 6
SUM OF ALL RESPONSES TO PHQ QUESTIONS 1-9: 10
4. FEELING TIRED OR HAVING LITTLE ENERGY: MORE THAN HALF THE DAYS

## 2024-07-01 ASSESSMENT — ENCOUNTER SYMPTOMS
OCCASIONAL FEELINGS OF UNSTEADINESS: 1
DEPRESSION: 0
LOSS OF SENSATION IN FEET: 0

## 2024-07-01 ASSESSMENT — LIFESTYLE VARIABLES
HOW OFTEN DO YOU HAVE SIX OR MORE DRINKS ON ONE OCCASION: NEVER
HOW OFTEN DO YOU HAVE A DRINK CONTAINING ALCOHOL: NEVER
HOW MANY STANDARD DRINKS CONTAINING ALCOHOL DO YOU HAVE ON A TYPICAL DAY: PATIENT DOES NOT DRINK
SKIP TO QUESTIONS 9-10: 1
AUDIT-C TOTAL SCORE: 0

## 2024-07-01 NOTE — PROGRESS NOTES
"History Of Present Illness  Shanti Gonsales is a 48 y.o. female presenting for 3 mo follow up (3 MO FOLLOW UP/) and Vertigo (HAS DONE PT, HAD CRYSTALS REALIGNED, PT TOLD HER IT MAY BE MAIN ARTERY BLOCKAGE OR MAY BE SOMETHING NEURO RELATED. C/O DIZZINESS. STATES HEAD FEELS \"OFF\" AND \"WEIRD\")  .    HPI     Dizziness started in January 2024, lasted hours.  It recurred and has been persistent despite vestibular therapy, seeing ENT, and trial of meclizine.   After dizziness passes, has some tingling.   Essential hypertension is controlled on metoprolol twice daily.  Her dizziness is not positional.  Heart rate in the 70s.  No weakness, falls.  No severe headache, or vision changes.          Past Medical History  Patient Active Problem List    Diagnosis Date Noted    Vertigo 03/12/2024    Acute back pain with sciatica 02/22/2024    Acute upper respiratory infection 02/22/2024    Angina pectoris ([facility]) 02/22/2024    Anxiety 02/22/2024    Carpal tunnel syndrome of left wrist 02/22/2024    Cough, unspecified 02/22/2024    Cystitis 02/22/2024    Dizziness and giddiness 02/22/2024    Eczema 02/22/2024    Gastroesophageal reflux disease 02/22/2024    Hand pain 02/22/2024    Kidney stone 02/22/2024    Lightheadedness 02/22/2024    Mixed hyperlipidemia 02/22/2024    Obstructive sleep apnea syndrome 02/22/2024    Otitis externa 02/22/2024    Primary hypertension 02/22/2024    Rheumatoid arthritis (Multi) 02/22/2024    Shortness of breath 02/22/2024    Venous thrombosis 02/22/2024    Prediabetes 02/22/2024    Hypothyroidism 02/09/2024    Pain, unspecified 02/28/2023    Seasonal allergic rhinitis 08/23/2019        Medications  Current Outpatient Medications   Medication Sig Dispense Refill    albuterol 90 mcg/actuation inhaler INHALE 2 PUFFS INTO THE LUNGS EVERY 4 HOURS AS NEEDED for 30      azelastine (Astelin) 137 mcg (0.1 %) nasal spray Administer into affected nostril(s) every 12 hours.      gemfibrozil (Lopid) 600 mg tablet " Take 1 tablet (600 mg) by mouth 2 times a day. 180 tablet 3    levothyroxine (Synthroid, Levoxyl) 100 mcg tablet TAKE 1 TABLET BY MOUTH EVERY DAY IN THE MORNING ON EMPTY STOMACH FOR 90 DAYS 90 tablet 2    metFORMIN XR (Glucophage-XR) 500 mg 24 hr tablet Take 1 tablet (500 mg) by mouth once daily in the evening. Take with meals. Do not crush, chew, or split. 90 tablet 1    mometasone (Elocon) 0.1 % cream 1 Application.      omeprazole (PriLOSEC) 20 mg DR capsule TAKE 1 CAPSULE BY MOUTH EVERY DAY FOR 90 DAYS 90 capsule 2    sertraline (Zoloft) 100 mg tablet TAKE 1 TABLET BY MOUTH EVERY DAY FOR 90 DAYS 90 tablet 3    metoprolol tartrate (Lopressor) 25 mg tablet Take 1 tablet (25 mg) by mouth 2 times a day. 90 tablet 3     No current facility-administered medications for this visit.        Surgical History  She has no past surgical history on file.     Social History  She reports that she has never smoked. She has never been exposed to tobacco smoke. She has never used smokeless tobacco. She reports that she does not drink alcohol and does not use drugs.    Family History  Family History   Problem Relation Name Age of Onset    Breast cancer Mother  35    Lung cancer Father Jabier     COPD Father Jabier     Emphysema Father Jabier     Heart failure Father Jabier     Prostate cancer Father Jabier         Allergies  Penicillins    Immunizations  Immunization History   Administered Date(s) Administered    Flu vaccine (IIV4), preservative free *Check age/dose* 10/13/2020, 10/19/2021    Flu vaccine, quadrivalent, no egg protein, age 6 month or greater (FLUCELVAX) 10/16/2020    Influenza, injectable, quadrivalent 10/10/2019, 10/04/2022    Influenza, seasonal, injectable 12/09/2015    Influenza, seasonal, injectable, preservative free 12/09/2015    Moderna SARS-CoV-2 Vaccination 01/08/2021, 02/04/2021    Tdap vaccine, age 7 year and older (BOOSTRIX, ADACEL) 09/01/2011    Zoster vaccine, recombinant, adult (SHINGRIX) 02/09/2024, 04/11/2024     "    ROS  Negative, except as discussed in HPI     Vitals  /84   Pulse 74   Temp 35.6 °C (96 °F)   Resp 18   Ht 1.549 m (5' 1\")   Wt 96.1 kg (211 lb 12.8 oz)   SpO2 92%   BMI 40.02 kg/m²      Physical Exam  Vitals and nursing note reviewed.   Constitutional:       General: She is not in acute distress.     Appearance: Normal appearance.   Cardiovascular:      Rate and Rhythm: Normal rate and regular rhythm.      Heart sounds: Normal heart sounds.   Pulmonary:      Effort: No respiratory distress.      Breath sounds: Normal breath sounds.   Neurological:      General: No focal deficit present.      Mental Status: She is alert. Mental status is at baseline.         Relevant Results  Lab Results   Component Value Date    WBC 8.8 02/21/2024    WBC 10.5 02/09/2024    HGB 13.5 02/21/2024    HGB 13.3 02/09/2024    HCT 42.0 02/21/2024    HCT 41.6 02/09/2024    MCV 82 02/21/2024    MCV 82 02/09/2024     02/21/2024     02/09/2024     Lab Results   Component Value Date     06/25/2024     02/21/2024    K 4.5 06/25/2024    K 4.0 02/21/2024     06/25/2024    CL 99 02/21/2024    CO2 27 06/25/2024    CO2 25 02/21/2024    BUN 21 06/25/2024    BUN 26 (H) 02/21/2024    CREATININE 1.10 06/25/2024    CREATININE 1.30 02/21/2024    CALCIUM 9.8 06/25/2024    CALCIUM 9.7 02/21/2024    PROT 8.3 (H) 02/21/2024    PROT 7.2 02/09/2024    BILITOT 0.2 02/21/2024    BILITOT 0.4 02/09/2024    ALKPHOS 76 02/21/2024    ALKPHOS 86 02/09/2024    ALT 16 02/21/2024    ALT 19 02/09/2024    AST 16 02/21/2024    AST 17 02/09/2024    GLUCOSE 93 06/25/2024    GLUCOSE 125 (H) 02/21/2024     Lab Results   Component Value Date    HGBA1C 5.7 (H) 06/25/2024     Lab Results   Component Value Date    TSH 2.63 02/09/2024      Lab Results   Component Value Date    CHOL 218 (H) 02/09/2024    TRIG 293 (H) 02/09/2024    HDL 39.0 (L) 02/09/2024           Assessment/Plan   Shanti was seen today for 3 mo follow up and " vertigo.  Diagnoses and all orders for this visit:  Dizziness and giddiness (Primary)  -     MR brain wo IV contrast; Future  -     Referral to Neurology; Future  Essential (primary) hypertension  -     metoprolol tartrate (Lopressor) 25 mg tablet; Take 1 tablet (25 mg) by mouth 2 times a day.         Counseling:   Medication education:   -Education:  The patient is counseled regarding potential side-effects of any and all new medications  -Understanding:  Patient expressed understanding of information discussed today  -Adherence:  No barriers to adherence identified    Final treatment plan is a result of shared decision making with patient.         Rip Lynn MD

## 2024-07-08 ENCOUNTER — APPOINTMENT (OUTPATIENT)
Dept: CARDIOLOGY | Facility: CLINIC | Age: 49
End: 2024-07-08
Payer: COMMERCIAL

## 2024-07-08 VITALS
HEART RATE: 72 BPM | WEIGHT: 212 LBS | SYSTOLIC BLOOD PRESSURE: 116 MMHG | RESPIRATION RATE: 18 BRPM | BODY MASS INDEX: 40.02 KG/M2 | DIASTOLIC BLOOD PRESSURE: 78 MMHG | OXYGEN SATURATION: 97 % | TEMPERATURE: 98.9 F | HEIGHT: 61 IN

## 2024-07-08 DIAGNOSIS — E78.2 MIXED HYPERLIPIDEMIA: ICD-10-CM

## 2024-07-08 DIAGNOSIS — I10 PRIMARY HYPERTENSION: Primary | ICD-10-CM

## 2024-07-08 DIAGNOSIS — R42 LIGHTHEADEDNESS: ICD-10-CM

## 2024-07-08 DIAGNOSIS — R42 VERTIGO: ICD-10-CM

## 2024-07-08 DIAGNOSIS — I20.9 ANGINA PECTORIS (CMS-HCC): ICD-10-CM

## 2024-07-08 DIAGNOSIS — R42 DIZZINESS AND GIDDINESS: ICD-10-CM

## 2024-07-08 PROCEDURE — 99214 OFFICE O/P EST MOD 30 MIN: CPT | Performed by: INTERNAL MEDICINE

## 2024-07-08 PROCEDURE — 3074F SYST BP LT 130 MM HG: CPT | Performed by: INTERNAL MEDICINE

## 2024-07-08 PROCEDURE — 3078F DIAST BP <80 MM HG: CPT | Performed by: INTERNAL MEDICINE

## 2024-07-08 PROCEDURE — 1036F TOBACCO NON-USER: CPT | Performed by: INTERNAL MEDICINE

## 2024-07-08 ASSESSMENT — ENCOUNTER SYMPTOMS
OCCASIONAL FEELINGS OF UNSTEADINESS: 0
DEPRESSION: 0
LOSS OF SENSATION IN FEET: 0

## 2024-07-08 ASSESSMENT — PAIN SCALES - GENERAL: PAINLEVEL: 0-NO PAIN

## 2024-07-08 ASSESSMENT — LIFESTYLE VARIABLES
SKIP TO QUESTIONS 9-10: 1
HAS A RELATIVE, FRIEND, DOCTOR, OR ANOTHER HEALTH PROFESSIONAL EXPRESSED CONCERN ABOUT YOUR DRINKING OR SUGGESTED YOU CUT DOWN: NO
AUDIT TOTAL SCORE: 0
HOW OFTEN DO YOU HAVE A DRINK CONTAINING ALCOHOL: NEVER
HOW OFTEN DO YOU HAVE SIX OR MORE DRINKS ON ONE OCCASION: NEVER
AUDIT-C TOTAL SCORE: 0
HOW MANY STANDARD DRINKS CONTAINING ALCOHOL DO YOU HAVE ON A TYPICAL DAY: PATIENT DOES NOT DRINK
HAVE YOU OR SOMEONE ELSE BEEN INJURED AS A RESULT OF YOUR DRINKING: NO

## 2024-07-08 NOTE — PROGRESS NOTES
History of present illness:  48 year old female patient here today following up on hx of atypical chest pains and abnormal EKG. her initial EKG was showing normal sinus rhythm nonspecific assiduous abnormality suggestive of LVH.. 2D Echo on 04/19/19 was normal with EF of 60-65% and Nuclear stress test in 09/2019 was normal with EF of 53%.  Patient underwent CT calcium score on September 2021 that showed 0 calcium in her arteries. Lung windows were okay.  Patient had 2D echo in February 2024 as a workup for abnormal EKG and vertigo that showed normal ejection fraction no major valve abnormalities.  No clear evidence of LVH.  Patient still complaining of vertigo and has been having treatment with the ENT but they believe she may have central vertigo.  She was referred back to me for that possible central vertigo.  Patient still having episodes of spinning sensation with moving her head to the sides even rolling in bed which goes against orthostatic hypotension  Past Medical History:   Diagnosis Date    Angina pectoris (CMS-[facility]) 02/22/2024    Disorder of thyroid, unspecified     Thyroid trouble    Dizziness [id number]    Mixed hyperlipidemia 02/22/2024    Other conditions influencing health status     Right handed    Personal history of other diseases of the circulatory system     History of hypertension    Personal history of other specified conditions     History of shortness of breath    Primary hypertension 02/22/2024    Unspecified disorder of nose and nasal sinuses     Sinus trouble       History reviewed. No pertinent surgical history.    Allergies   Allergen Reactions    Penicillins Swelling and Unknown     Facial swelling        reports that she has never smoked. She has never been exposed to tobacco smoke. She has never used smokeless tobacco. She reports that she does not drink alcohol and does not use drugs.    Family History   Problem Relation Name Age of Onset    Breast cancer Mother  35    Lung cancer  Father Jabier     COPD Father Jabier     Emphysema Father Jabier     Heart failure Father Jabier     Prostate cancer Father Jabier        Patient's Medications   New Prescriptions    No medications on file   Previous Medications    ALBUTEROL 90 MCG/ACTUATION INHALER    INHALE 2 PUFFS INTO THE LUNGS EVERY 4 HOURS AS NEEDED for 30    AZELASTINE (ASTELIN) 137 MCG (0.1 %) NASAL SPRAY    Administer into affected nostril(s) every 12 hours.    GEMFIBROZIL (LOPID) 600 MG TABLET    Take 1 tablet (600 mg) by mouth 2 times a day.    LEVOTHYROXINE (SYNTHROID, LEVOXYL) 100 MCG TABLET    TAKE 1 TABLET BY MOUTH EVERY DAY IN THE MORNING ON EMPTY STOMACH FOR 90 DAYS    METFORMIN XR (GLUCOPHAGE-XR) 500 MG 24 HR TABLET    Take 1 tablet (500 mg) by mouth once daily in the evening. Take with meals. Do not crush, chew, or split.    METOPROLOL TARTRATE (LOPRESSOR) 25 MG TABLET    Take 1 tablet (25 mg) by mouth 2 times a day.    MOMETASONE (ELOCON) 0.1 % CREAM    1 Application.    OMEPRAZOLE (PRILOSEC) 20 MG DR CAPSULE    TAKE 1 CAPSULE BY MOUTH EVERY DAY FOR 90 DAYS    SERTRALINE (ZOLOFT) 100 MG TABLET    TAKE 1 TABLET BY MOUTH EVERY DAY FOR 90 DAYS   Modified Medications    No medications on file   Discontinued Medications    No medications on file       Objective   Physical Exam  General: Patient in no acute distress   HEENT: Atraumatic normocephalic.  Neck: Supple, jugular venous pressure within normal limit.  No bruits  Lungs: Clear to auscultation bilaterally  Cardiovascular: Regular rate and rhythm, normal heart sounds, no murmurs rubs or gallops  Abdomen: Soft nontender nondistended.  Normal bowel sounds.  Extremities: Warm to touch, no edema.      Lab Review   Lab on 06/25/2024   Component Date Value    Hemoglobin A1C 06/25/2024 5.7 (H)     Estimated Average Glucose 06/25/2024 117     Glucose 06/25/2024 93     Sodium 06/25/2024 140     Potassium 06/25/2024 4.5     Chloride 06/25/2024 101     Bicarbonate 06/25/2024 27     Urea Nitrogen 06/25/2024  21     Creatinine 06/25/2024 1.10     eGFR 06/25/2024 61     Calcium 06/25/2024 9.8     Anion Gap 06/25/2024 12    Lab Requisition on 05/13/2024   Component Date Value    Case Report 05/13/2024                      Value:Gynecologic Cytology                              Case: [id number]                                   Authorizing Provider:  Valentina Chaidez MD    Collected:           05/13/2024 1352              Ordering Location:     [facility]       Received:            05/15/2024 1136                                     Center                                                                       First Screen:          Mariaelena Roberto, CT                                                              Rescreen:              SHARIFA Fong                                                          Specimen:    ThinPrep Liquid-Based Pap-Imaging System Screen, ECTO/ENDOCERVIX/VAGINA, SCREENING         Final Cytological Interp* 05/13/2024                      Value:This result contains rich text formatting which cannot be displayed here.      05/13/2024                      Value:This result contains rich text formatting which cannot be displayed here.    ThinPrep Imaging System 05/13/2024                      Value:This result contains rich text formatting which cannot be displayed here.    Educational Note 05/13/2024                      Value:This result contains rich text formatting which cannot be displayed here.    Perform HPV HR test? 05/13/2024 Always (all interpretations)     Include HPV Genotype? 05/13/2024 Yes     LMP 05/13/2024 1/1/2023     HPV, high-risk 05/13/2024 Negative     HPV Type 16 DNA 05/13/2024 Negative     HPV Type 18 DNA 05/13/2024 Negative     HPV non-Type 16 or 18 DNA 05/13/2024 Negative         Assessment/Plan   Patient Active Problem List   Diagnosis    Acute back pain with sciatica    Acute upper respiratory infection    Angina pectoris (CMS-HCC)    Anxiety    Carpal tunnel  syndrome of left wrist    Cough, unspecified    Cystitis    Dizziness and giddiness    Eczema    Gastroesophageal reflux disease    Hand pain    Hypothyroidism    Kidney stone    Lightheadedness    Mixed hyperlipidemia    Obstructive sleep apnea syndrome    Otitis externa    Pain, unspecified    Primary hypertension    Rheumatoid arthritis (Multi)    Seasonal allergic rhinitis    Shortness of breath    Venous thrombosis    Prediabetes    Vertigo      48 year old female patient here today following up on hx of atypical chest pains and abnormal EKG. her initial EKG was showing normal sinus rhythm nonspecific assiduous abnormality suggestive of LVH.. 2D Echo on 04/19/19 was normal with EF of 60-65% and Nuclear stress test in 09/2019 was normal with EF of 53%.  Patient underwent CT calcium score on September 2021 that showed 0 calcium in her arteries. Lung windows were okay.  Patient had 2D echo in February 2024 as a workup for abnormal EKG and vertigo that showed normal ejection fraction no major valve abnormalities.  No clear evidence of LVH.  Patient still complaining of vertigo and has been having treatment with the ENT but they believe she may have central vertigo.  She was referred back to me for that possible central vertigo.  Patient still having episodes of spinning sensation with moving her head to the sides even rolling in bed which goes against orthostatic hypotension.  Doubt that her dizziness or vertigo is related to her heart.  Blood pressure and heart rate well-controlled.  Cholesterol at target.  I will refer her to Dr. Kasper for evaluation for possible central vertigo.  She has MRI scheduled next week.  Will order carotid duplex.  Otherwise from cardiac standpoint she stable we will follow-up in 4 months.      Sebastian Cintron MD

## 2024-07-15 ENCOUNTER — HOSPITAL ENCOUNTER (OUTPATIENT)
Dept: RADIOLOGY | Facility: HOSPITAL | Age: 49
Discharge: HOME | End: 2024-07-15
Payer: COMMERCIAL

## 2024-07-15 DIAGNOSIS — R42 DIZZINESS AND GIDDINESS: ICD-10-CM

## 2024-07-15 PROCEDURE — 70551 MRI BRAIN STEM W/O DYE: CPT | Performed by: RADIOLOGY

## 2024-07-15 PROCEDURE — 70551 MRI BRAIN STEM W/O DYE: CPT

## 2024-07-22 ENCOUNTER — ANCILLARY PROCEDURE (OUTPATIENT)
Dept: VASCULAR MEDICINE | Facility: CLINIC | Age: 49
End: 2024-07-22
Payer: COMMERCIAL

## 2024-07-22 DIAGNOSIS — R42 LIGHTHEADEDNESS: ICD-10-CM

## 2024-07-22 DIAGNOSIS — R42 VERTIGO: ICD-10-CM

## 2024-07-22 DIAGNOSIS — R42 DIZZINESS AND GIDDINESS: ICD-10-CM

## 2024-07-22 PROCEDURE — 93880 EXTRACRANIAL BILAT STUDY: CPT

## 2024-07-22 PROCEDURE — 93880 EXTRACRANIAL BILAT STUDY: CPT | Performed by: SURGERY

## 2024-07-23 ENCOUNTER — TELEPHONE (OUTPATIENT)
Dept: CARDIOLOGY | Facility: CLINIC | Age: 49
End: 2024-07-23
Payer: COMMERCIAL

## 2024-07-23 NOTE — PROGRESS NOTES
Spoke to patient about the result of her carotid duplex.  I doubt her symptoms are related to this finding..  At this point I will send her for vascular surgery evaluation.  I told her to hold off on her maneuvers for vertigo therapy until she sees Dr. Goldberg to make sure that she does not have any carotid dissection or any similar entity that may cause a problem like this.

## 2024-08-08 ENCOUNTER — TELEMEDICINE (OUTPATIENT)
Dept: PRIMARY CARE | Facility: CLINIC | Age: 49
End: 2024-08-08
Payer: COMMERCIAL

## 2024-08-08 DIAGNOSIS — U07.1 COVID-19 VIRUS INFECTION: Primary | ICD-10-CM

## 2024-08-08 PROCEDURE — 99214 OFFICE O/P EST MOD 30 MIN: CPT | Performed by: FAMILY MEDICINE

## 2024-08-08 PROCEDURE — 1036F TOBACCO NON-USER: CPT | Performed by: FAMILY MEDICINE

## 2024-08-08 RX ORDER — TOPIRAMATE 25 MG/1
1 TABLET ORAL
COMMUNITY
Start: 2024-07-25

## 2024-08-08 RX ORDER — GUAIFENESIN 600 MG/1
1200 TABLET, EXTENDED RELEASE ORAL 2 TIMES DAILY PRN
Qty: 30 TABLET | Refills: 0 | Status: SHIPPED | OUTPATIENT
Start: 2024-08-08

## 2024-08-08 RX ORDER — BENZONATATE 200 MG/1
200 CAPSULE ORAL 3 TIMES DAILY PRN
Qty: 30 CAPSULE | Refills: 0 | Status: SHIPPED | OUTPATIENT
Start: 2024-08-08

## 2024-08-08 RX ORDER — ALBUTEROL SULFATE 90 UG/1
2 INHALANT RESPIRATORY (INHALATION)
Qty: 18 G | Refills: 0 | Status: SHIPPED | OUTPATIENT
Start: 2024-08-08

## 2024-08-08 RX ORDER — NIRMATRELVIR AND RITONAVIR 300-100 MG
3 KIT ORAL 2 TIMES DAILY
Qty: 30 TABLET | Refills: 0 | Status: SHIPPED | OUTPATIENT
Start: 2024-08-08 | End: 2024-08-13

## 2024-08-08 ASSESSMENT — LIFESTYLE VARIABLES
SKIP TO QUESTIONS 9-10: 1
AUDIT-C TOTAL SCORE: 0
HOW OFTEN DO YOU HAVE SIX OR MORE DRINKS ON ONE OCCASION: NEVER
HOW MANY STANDARD DRINKS CONTAINING ALCOHOL DO YOU HAVE ON A TYPICAL DAY: PATIENT DOES NOT DRINK
HOW OFTEN DO YOU HAVE A DRINK CONTAINING ALCOHOL: NEVER

## 2024-08-08 ASSESSMENT — ENCOUNTER SYMPTOMS
DEPRESSION: 0
LOSS OF SENSATION IN FEET: 0
OCCASIONAL FEELINGS OF UNSTEADINESS: 1

## 2024-08-08 ASSESSMENT — PATIENT HEALTH QUESTIONNAIRE - PHQ9
2. FEELING DOWN, DEPRESSED OR HOPELESS: NOT AT ALL
SUM OF ALL RESPONSES TO PHQ9 QUESTIONS 1 AND 2: 0
1. LITTLE INTEREST OR PLEASURE IN DOING THINGS: NOT AT ALL

## 2024-08-08 ASSESSMENT — PAIN SCALES - GENERAL: PAINLEVEL: 0-NO PAIN

## 2024-08-08 NOTE — PROGRESS NOTES
With patient's permission this is a telemedicine visit with video and audio.    History Of Present Illness  Shanti Gonsales is a 49 y.o. female who calls in for COVID (TESTED POSITIVE YESTERDAY VIA HOME TEST./HAS C/O CONGESTION,STUFFY NOSE, BODY ACHES, NECK PAIN, ST, COUGH).    HPI    Symptoms started about 4 days ago.  Test positive for COVID 19 one day ago  Still with cough, productive with thick mucus.  Slight tightness in chest but no pain.   Hasn't taken any OTC meds yet.       Past Medical History  She has a past medical history of Angina pectoris (CMS-[facility]) (02/22/2024), Disorder of thyroid, unspecified, Dizziness ([id number]), Mixed hyperlipidemia (02/22/2024), Other conditions influencing health status, Personal history of other diseases of the circulatory system, Personal history of other specified conditions, Primary hypertension (02/22/2024), and Unspecified disorder of nose and nasal sinuses.    Medications  Current Outpatient Medications on File Prior to Visit   Medication Sig Dispense Refill    azelastine (Astelin) 137 mcg (0.1 %) nasal spray Administer into affected nostril(s) every 12 hours.      gemfibrozil (Lopid) 600 mg tablet Take 1 tablet (600 mg) by mouth 2 times a day. 180 tablet 3    levothyroxine (Synthroid, Levoxyl) 100 mcg tablet TAKE 1 TABLET BY MOUTH EVERY DAY IN THE MORNING ON EMPTY STOMACH FOR 90 DAYS 90 tablet 2    metFORMIN XR (Glucophage-XR) 500 mg 24 hr tablet Take 1 tablet (500 mg) by mouth once daily in the evening. Take with meals. Do not crush, chew, or split. 90 tablet 1    metoprolol tartrate (Lopressor) 25 mg tablet Take 1 tablet (25 mg) by mouth 2 times a day. 90 tablet 3    mometasone (Elocon) 0.1 % cream 1 Application.      omeprazole (PriLOSEC) 20 mg DR capsule TAKE 1 CAPSULE BY MOUTH EVERY DAY FOR 90 DAYS 90 capsule 2    sertraline (Zoloft) 100 mg tablet TAKE 1 TABLET BY MOUTH EVERY DAY FOR 90 DAYS 90 tablet 3    topiramate (Topamax) 25 mg tablet Take 1 tablet (25 mg) by  mouth every 12 hours.      [DISCONTINUED] albuterol 90 mcg/actuation inhaler INHALE 2 PUFFS INTO THE LUNGS EVERY 4 HOURS AS NEEDED for 30       No current facility-administered medications on file prior to visit.        Surgical History  She has no past surgical history on file.     Social History  She reports that she has never smoked. She has never been exposed to tobacco smoke. She has never used smokeless tobacco. She reports that she does not drink alcohol and does not use drugs.    Family History  Family History   Problem Relation Name Age of Onset    Breast cancer Mother  35    Lung cancer Father Jabier     COPD Father Jabier     Emphysema Father Jabier     Heart failure Father Jabier     Prostate cancer Father Jabier         Allergies  Penicillins    On video:     Appearance: Normal appearance.      Effort: Respiratory effort is normal. Speaking in full sentences. No respiratory distress.     Mood and Affect: Mood normal.         Thought Content: Thought content normal.         Judgment: Judgment normal.      Last Recorded Vitals  There were no vitals taken for this visit.  (Vitals are taken by patient at home, if any reported)    Relevant Results  Last BMP 6/24/25 with GFR 61    Assessment/Plan   Shanti was seen today for covid.  Diagnoses and all orders for this visit:  COVID-19 virus infection (Primary)  -     nirmatrelvir-ritonavir (Paxlovid) 300 mg (150 mg x 2)-100 mg tablet therapy pack; Take 3 tablets by mouth 2 times a day for 5 days. Follow the instructions on the package  -     guaiFENesin (Mucinex) 600 mg 12 hr tablet; Take 2 tablets (1,200 mg) by mouth 2 times a day as needed for cough or congestion. Do not crush, chew, or split.  -     benzonatate (Tessalon) 200 mg capsule; Take 1 capsule (200 mg) by mouth 3 times a day as needed for cough. Do not crush or chew.  -     albuterol 90 mcg/actuation inhaler; Inhale 2 puffs 4 times a day.          Medications Discontinued During This Encounter   Medication Reason     albuterol 90 mcg/actuation inhaler Reorder        Rip Lynn MD

## 2024-08-08 NOTE — LETTER
August 8, 2024     Patient: Shanti Gonsales   YOB: 1975   Date of Visit: 8/8/2024       To Whom It May Concern:    Shanti Gonsales was seen in my clinic on 8/8/2024 at 12:45 pm. Please excuse Shanti for her absence from work on this day to make the appointment. Due to sickness, please excuse her from work this week. She can return to work without restrictions on  8/12/24.    If you have any questions or concerns, please don't hesitate to call.         Sincerely,         Rip Lynn MD        CC: No Recipients

## 2024-09-14 DIAGNOSIS — Z76.0 MEDICATION REFILL: ICD-10-CM

## 2024-09-14 DIAGNOSIS — R73.03 PREDIABETES: ICD-10-CM

## 2024-09-17 RX ORDER — OMEPRAZOLE 20 MG/1
20 CAPSULE, DELAYED RELEASE ORAL DAILY
Qty: 90 CAPSULE | Refills: 1 | Status: SHIPPED | OUTPATIENT
Start: 2024-09-17

## 2024-09-17 RX ORDER — METFORMIN HYDROCHLORIDE 500 MG/1
TABLET, EXTENDED RELEASE ORAL
Qty: 90 TABLET | Refills: 1 | Status: SHIPPED | OUTPATIENT
Start: 2024-09-17

## 2024-09-17 RX ORDER — LEVOTHYROXINE SODIUM 100 UG/1
100 TABLET ORAL
Qty: 90 TABLET | Refills: 1 | Status: SHIPPED | OUTPATIENT
Start: 2024-09-17 | End: 2025-03-16

## 2024-09-17 NOTE — TELEPHONE ENCOUNTER
Prescription request received and populated   Pharmacy populated  Last Office Visit: 8/08/24 for telemed appmt   Last 3 mo follow up 7/2024

## 2024-09-18 ENCOUNTER — OFFICE VISIT (OUTPATIENT)
Dept: VASCULAR SURGERY | Facility: CLINIC | Age: 49
End: 2024-09-18
Payer: COMMERCIAL

## 2024-09-18 VITALS — BODY MASS INDEX: 41.57 KG/M2 | WEIGHT: 220 LBS | DIASTOLIC BLOOD PRESSURE: 72 MMHG | SYSTOLIC BLOOD PRESSURE: 134 MMHG

## 2024-09-18 DIAGNOSIS — R42 DIZZINESS AND GIDDINESS: ICD-10-CM

## 2024-09-18 PROCEDURE — 3075F SYST BP GE 130 - 139MM HG: CPT | Performed by: STUDENT IN AN ORGANIZED HEALTH CARE EDUCATION/TRAINING PROGRAM

## 2024-09-18 PROCEDURE — 99203 OFFICE O/P NEW LOW 30 MIN: CPT | Performed by: STUDENT IN AN ORGANIZED HEALTH CARE EDUCATION/TRAINING PROGRAM

## 2024-09-18 PROCEDURE — 99213 OFFICE O/P EST LOW 20 MIN: CPT | Performed by: STUDENT IN AN ORGANIZED HEALTH CARE EDUCATION/TRAINING PROGRAM

## 2024-09-18 PROCEDURE — 3078F DIAST BP <80 MM HG: CPT | Performed by: STUDENT IN AN ORGANIZED HEALTH CARE EDUCATION/TRAINING PROGRAM

## 2024-09-18 ASSESSMENT — PATIENT HEALTH QUESTIONNAIRE - PHQ9
10. IF YOU CHECKED OFF ANY PROBLEMS, HOW DIFFICULT HAVE THESE PROBLEMS MADE IT FOR YOU TO DO YOUR WORK, TAKE CARE OF THINGS AT HOME, OR GET ALONG WITH OTHER PEOPLE: NOT DIFFICULT AT ALL
1. LITTLE INTEREST OR PLEASURE IN DOING THINGS: SEVERAL DAYS
SUM OF ALL RESPONSES TO PHQ9 QUESTIONS 1 AND 2: 2
2. FEELING DOWN, DEPRESSED OR HOPELESS: SEVERAL DAYS

## 2024-09-18 ASSESSMENT — ENCOUNTER SYMPTOMS
OCCASIONAL FEELINGS OF UNSTEADINESS: 1
DEPRESSION: 0
LOSS OF SENSATION IN FEET: 1

## 2024-09-18 ASSESSMENT — COLUMBIA-SUICIDE SEVERITY RATING SCALE - C-SSRS
6. HAVE YOU EVER DONE ANYTHING, STARTED TO DO ANYTHING, OR PREPARED TO DO ANYTHING TO END YOUR LIFE?: NO
2. HAVE YOU ACTUALLY HAD ANY THOUGHTS OF KILLING YOURSELF?: NO
1. IN THE PAST MONTH, HAVE YOU WISHED YOU WERE DEAD OR WISHED YOU COULD GO TO SLEEP AND NOT WAKE UP?: NO

## 2024-09-18 ASSESSMENT — PAIN SCALES - GENERAL: PAINLEVEL: 0-NO PAIN

## 2024-09-24 ENCOUNTER — APPOINTMENT (OUTPATIENT)
Dept: VASCULAR SURGERY | Facility: CLINIC | Age: 49
End: 2024-09-24
Payer: COMMERCIAL

## 2024-09-24 ASSESSMENT — ENCOUNTER SYMPTOMS
RESPIRATORY NEGATIVE: 1
CARDIOVASCULAR NEGATIVE: 1
PSYCHIATRIC NEGATIVE: 1
MUSCULOSKELETAL NEGATIVE: 1
HEMATOLOGIC/LYMPHATIC NEGATIVE: 1
NEUROLOGICAL NEGATIVE: 1
ALLERGIC/IMMUNOLOGIC NEGATIVE: 1
GASTROINTESTINAL NEGATIVE: 1
EYES NEGATIVE: 1
CONSTITUTIONAL NEGATIVE: 1
ENDOCRINE NEGATIVE: 1

## 2024-09-24 NOTE — PROGRESS NOTES
Reason for Visit: Establish Care (vertigo).  Referring Clinician: Saida     History of Present Illness  Shanti Gonsales is a 49 y.o. female who presents to clinic for surgical evaluation of verigo. Patient reports that she has been having issues with vertigo for the past year. She denies any vision changes, weakness, numbness, arm pain/claudication, prior strokes/TIAs. She has had a fairly detailed work up for vertigo without any diagnosis. She presents today for evaluation after she was noted to have a right vertebral artery with his resistance waveforms concerning for more distal occlusion. The contralateral vertebral artery and bilateral carotids without disease. Of note, she has had MRI brain which was unremarkable. She has been seen by otorhinolaryngology and has been diagnosed with benign paroxysmal positional vertigo.     Past Medical History  She has a past medical history of Angina pectoris (CMS-[facility]) (02/22/2024), Disorder of thyroid, unspecified, Dizziness ([id number]), Mixed hyperlipidemia (02/22/2024), Other conditions influencing health status, Personal history of other diseases of the circulatory system, Personal history of other specified conditions, Primary hypertension (02/22/2024), and Unspecified disorder of nose and nasal sinuses.    Past Surgical History  She has no past surgical history on file.    Social History  She reports that she has never smoked. She has never been exposed to tobacco smoke. She has never used smokeless tobacco. She reports that she does not drink alcohol and does not use drugs.    Family History  Family History   Problem Relation Name Age of Onset    Breast cancer Mother  35    Lung cancer Father Jabier     COPD Father Jabier     Emphysema Father Jabier     Heart failure Father Jabier     Prostate cancer Father Jabier        Allergies  Penicillins    Outpatient Medications  Current Outpatient Medications   Medication  Instructions    albuterol 90 mcg/actuation inhaler 2 puffs, inhalation, 4 times daily RT    azelastine (Astelin) 137 mcg (0.1 %) nasal spray nasal, Every 12 hours    benzonatate (TESSALON) 200 mg, oral, 3 times daily PRN, Do not crush or chew.    gemfibrozil (LOPID) 600 mg, oral, 2 times daily    guaiFENesin (MUCINEX) 1,200 mg, oral, 2 times daily PRN, Do not crush, chew, or split.    levothyroxine (SYNTHROID, LEVOXYL) 100 mcg, oral, Daily before breakfast    metFORMIN  mg 24 hr tablet TAKE 1 TABLET (500 MG) BY MOUTH ONCE DAILY IN THE EVENING WITH MEALS. DO NOT CRUSH, CHEW, OR SPLIT.    metoprolol tartrate (LOPRESSOR) 25 mg, oral, 2 times daily    mometasone (Elocon) 0.1 % cream 1 Application    omeprazole (PRILOSEC) 20 mg, oral, Daily    sertraline (ZOLOFT) 100 mg, oral, Daily       Review of Systems  Review of Systems   Constitutional: Negative.   HENT: Negative.     Eyes: Negative.    Cardiovascular: Negative.    Respiratory: Negative.     Endocrine: Negative.    Hematologic/Lymphatic: Negative.    Skin: Negative.    Musculoskeletal: Negative.    Gastrointestinal: Negative.    Genitourinary: Negative.    Neurological: Negative.    Psychiatric/Behavioral: Negative.     Allergic/Immunologic: Negative.        Last Recorded Vitals  Vitals:    09/18/24 1417   BP: 134/72   Weight: 99.8 kg (220 lb)       Physical Examination  General: Well appearing, well-nourished, in no acute distress.  HEENT: Normocephalic atraumatic, pupils equal and reactive to light, extraocular muscles intact, no conjunctival injection, oropharynx clear without exudates.  Neck: Normal carotid arterial pulses, no arterial bruits, no thyromegaly.  Cardiac: Regular rhythm and normal heart rate.   Pulmonary: No increased work of breathing, no wheezes or crackles.  GI: soft, ND, NT  Lower extremities: No cyanosis, clubbing, or edema.  No xanthelasma present. Normal distal pulses.  Skin: Skin intact. No significant rashes or lesions  "present.  Neuro: Alert and oriented x 3, normal attention and cognition, no focal motor or sensory neurologic deficits.  Psych: Normal affect and mood.  Musculoskeletal: Normal gait normal muscle tone.    Laboratory Studies  Lab Results   Component Value Date    GLUCOSE 93 06/25/2024    CALCIUM 9.8 06/25/2024     06/25/2024    K 4.5 06/25/2024    CO2 27 06/25/2024     06/25/2024    BUN 21 06/25/2024    CREATININE 1.10 06/25/2024     Lab Results   Component Value Date    ALT 16 02/21/2024    AST 16 02/21/2024    ALKPHOS 76 02/21/2024    BILITOT 0.2 02/21/2024         Lab Results   Component Value Date    CHOL 218 (H) 02/09/2024    CHOL 195 10/04/2022    CHOL 176 10/12/2019     Lab Results   Component Value Date    HDL 39.0 (L) 02/09/2024    HDL 39 (L) 10/04/2022    HDL 37 (L) 10/12/2019     Lab Results   Component Value Date    LDLCALC 120 02/09/2024    LDLCALC 111 10/04/2022    LDLCALC 84 10/12/2019     Lab Results   Component Value Date    TRIG 293 (H) 02/09/2024    TRIG 227 (H) 10/04/2022    TRIG 274 (H) 10/12/2019     No components found for: \"CHOLHDL\"  Lab Results   Component Value Date    HGBA1C 5.7 (H) 06/25/2024     No components found for: \"UACR\"    Carotid Duplex    Imaging & Doppler Findings:      Right                        Left    PSV      EDV                PSV      EDV  98 cm/s            CCA P    132 cm/s  65 cm/s            CCA D    79 cm/s  86 cm/s  18 cm/s   ICA P    81 cm/s  23 cm/s  78 cm/s  35 cm/s   ICA M    77 cm/s  35 cm/s  57 cm/s  29 cm/s   ICA D    80 cm/s  36 cm/s  83 cm/s             ECA     83 cm/s  28 cm/s  6 cm/s  Vertebral  41 cm/s  18 cm/s  124 cm/s         Subclavian 146 cm/s                      Right Left  ICA/CCA Ratio  1.3  1.0    I personally reviewed the imaging studies       Assessment and Plan  Problem List Items Addressed This Visit          Symptoms and Signs    Dizziness and giddiness       Shanti Chávezler is a 49 year old female with vertigo and an " incidental finding of right vertebral artery stenosis. She has had a work up done with ENT and was diagnosed with BPPV. Her contralateral vertebral artery and bilateral carotid arteries are without any disease and she denies any visual disturbances. Low likelihood of the right vertebral artery stenosis are being the cause of the vertigo. As a result, would not recommend any surgical intervention at this time. Patient to return to clinic on a PRN basis.     Eren Swann MD

## 2024-10-02 ENCOUNTER — APPOINTMENT (OUTPATIENT)
Dept: OTOLARYNGOLOGY | Facility: CLINIC | Age: 49
End: 2024-10-02
Payer: COMMERCIAL

## 2024-10-02 VITALS — BODY MASS INDEX: 41.54 KG/M2 | WEIGHT: 220 LBS | HEIGHT: 61 IN

## 2024-10-02 DIAGNOSIS — H81.11 BENIGN PAROXYSMAL POSITIONAL VERTIGO OF RIGHT EAR: Primary | ICD-10-CM

## 2024-10-02 DIAGNOSIS — H90.3 SENSORINEURAL HEARING LOSS (SNHL) OF BOTH EARS: ICD-10-CM

## 2024-10-02 DIAGNOSIS — R42 DIZZINESS: ICD-10-CM

## 2024-10-02 PROCEDURE — 99214 OFFICE O/P EST MOD 30 MIN: CPT | Performed by: OTOLARYNGOLOGY

## 2024-10-02 PROCEDURE — 1036F TOBACCO NON-USER: CPT | Performed by: OTOLARYNGOLOGY

## 2024-10-02 PROCEDURE — 3008F BODY MASS INDEX DOCD: CPT | Performed by: OTOLARYNGOLOGY

## 2024-10-02 RX ORDER — ASPIRIN 81 MG/1
81 TABLET ORAL DAILY
COMMUNITY

## 2024-10-02 NOTE — PROGRESS NOTES
HPI  Shanti Gonsales is a 49 y.o. female seen by Dr. Koroma in April with vertigo.  Seem to improve with otolith repositioning maneuvers and was diagnosed as BPPV at that time.  Since that time she reports that she has had ongoing instability.  She has been evaluated by physical therapy at [facility] and was not making much progress.  Underwent evaluation by vascular surgery and neurology including negative MRI, negative EEG, negative Topamax trial.  She has continued imbalance.  She does not have hearing loss but has history of bilateral sensorineural hearing loss on audiogram      Past Medical History:   Diagnosis Date    Angina pectoris 02/22/2024    Disorder of thyroid, unspecified     Thyroid trouble    Dizziness [id number]    Mixed hyperlipidemia 02/22/2024    Other conditions influencing health status     Right handed    Personal history of other diseases of the circulatory system     History of hypertension    Personal history of other specified conditions     History of shortness of breath    Primary hypertension 02/22/2024    Unspecified disorder of nose and nasal sinuses     Sinus trouble            Medications:     Current Outpatient Medications:     albuterol 90 mcg/actuation inhaler, Inhale 2 puffs 4 times a day., Disp: 18 g, Rfl: 0    aspirin 81 mg EC tablet, Take 1 tablet (81 mg) by mouth once daily., Disp: , Rfl:     azelastine (Astelin) 137 mcg (0.1 %) nasal spray, Administer into affected nostril(s) every 12 hours., Disp: , Rfl:     benzonatate (Tessalon) 200 mg capsule, Take 1 capsule (200 mg) by mouth 3 times a day as needed for cough. Do not crush or chew., Disp: 30 capsule, Rfl: 0    gemfibrozil (Lopid) 600 mg tablet, Take 1 tablet (600 mg) by mouth 2 times a day., Disp: 180 tablet, Rfl: 3    guaiFENesin (Mucinex) 600 mg 12 hr tablet, Take 2 tablets (1,200 mg) by mouth 2 times a day as needed for cough or congestion. Do not crush, chew, or split., Disp: 30 tablet, Rfl: 0    levothyroxine (Synthroid,  "Levoxyl) 100 mcg tablet, TAKE 1 TABLET BY MOUTH EVERY DAY IN THE MORNING ON EMPTY STOMACH FOR 90 DAYS, Disp: 90 tablet, Rfl: 1    metFORMIN  mg 24 hr tablet, TAKE 1 TABLET (500 MG) BY MOUTH ONCE DAILY IN THE EVENING WITH MEALS. DO NOT CRUSH, CHEW, OR SPLIT., Disp: 90 tablet, Rfl: 1    metoprolol tartrate (Lopressor) 25 mg tablet, Take 1 tablet (25 mg) by mouth 2 times a day., Disp: 90 tablet, Rfl: 3    mometasone (Elocon) 0.1 % cream, 1 Application., Disp: , Rfl:     omeprazole (PriLOSEC) 20 mg DR capsule, TAKE 1 CAPSULE BY MOUTH EVERY DAY, Disp: 90 capsule, Rfl: 1    sertraline (Zoloft) 100 mg tablet, TAKE 1 TABLET BY MOUTH EVERY DAY FOR 90 DAYS, Disp: 90 tablet, Rfl: 3     Allergies:  Allergies   Allergen Reactions    Penicillins Swelling and Unknown     Facial swelling        Physical Exam:  Last Recorded Vitals  Height 1.549 m (5' 1\"), weight 99.8 kg (220 lb).  General:     General appearance: Well-developed, well-nourished in no acute distress.       Voice:  normal       Head/face: Normal appearance; nontender to palpation     Facial nerve function: Normal and symmetric bilaterally.    Oral/oropharynx:     Oral vestibule: Normal labial and gingival mucosa     Tongue/floor of mouth: Normal without lesion     Oropharynx: Clear.  No lesions present of the hard/soft palate, posterior pharynx    Neck:     Neck: Normal appearance, trachea midline     Salivary glands: Normal to palpation bilaterally     Lymph nodes: No cervical lymphadenopathy to palpation     Thyroid: No thyromegaly.  No palpable nodules     Range of motion: Normal    Neurological:     Cortical functions: Alert and oriented x3, appropriate affect       Larynx/hypopharynx:     Laryngeal findings: Mirror exam inadequate or limited secondary to enlarged base of tongue and/or excessive gagging    Ear:     Ear canal: Normal bilaterally     Tympanic membrane: Intact and mobile bilaterally     Pinna: Normal bilaterally     Hearing:  Gross hearing " assessment normal by voice  Ernesto-Hallpike testing with some nystagmus head hanging to the right.  Otolith repositioning maneuver performed x 1 to the right    Nose:     Visualized using: Anterior rhinoscopy     Nasopharynx: Inadequate mirror exam secondary to gag, anatomy.       Nasal dorsum: Nontraumatic midline appearance     Septum: Midline     Inferior turbinates: Normally sized     Mucosa: Bilateral, pink, normal appearing       ASSESSMENT/PLAN:  ORM x 1 to the right.  Some of her findings are consistent with BPPV but perhaps this is not the entire story.  She may have some underlying alternative explanation for her generalized instability but as yet her workup has not revealed it.  I have recommended balance exercises and recommended physical therapy here in the  system as a trial and she will follow-up with us after        Pedro Pablo Vu MD

## 2024-10-14 ENCOUNTER — APPOINTMENT (OUTPATIENT)
Dept: CARDIOLOGY | Facility: CLINIC | Age: 49
End: 2024-10-14
Payer: COMMERCIAL

## 2024-10-14 VITALS
DIASTOLIC BLOOD PRESSURE: 78 MMHG | BODY MASS INDEX: 41.69 KG/M2 | SYSTOLIC BLOOD PRESSURE: 124 MMHG | HEIGHT: 61 IN | RESPIRATION RATE: 16 BRPM | WEIGHT: 220.8 LBS | HEART RATE: 64 BPM

## 2024-10-14 DIAGNOSIS — E78.2 MIXED HYPERLIPIDEMIA: ICD-10-CM

## 2024-10-14 DIAGNOSIS — R42 VERTIGO: ICD-10-CM

## 2024-10-14 DIAGNOSIS — I20.9 ANGINA PECTORIS: ICD-10-CM

## 2024-10-14 DIAGNOSIS — I10 PRIMARY HYPERTENSION: ICD-10-CM

## 2024-10-14 DIAGNOSIS — E78.1 HYPERTRIGLYCERIDEMIA: Primary | ICD-10-CM

## 2024-10-14 PROCEDURE — 3078F DIAST BP <80 MM HG: CPT | Performed by: INTERNAL MEDICINE

## 2024-10-14 PROCEDURE — 99214 OFFICE O/P EST MOD 30 MIN: CPT | Performed by: INTERNAL MEDICINE

## 2024-10-14 PROCEDURE — 3008F BODY MASS INDEX DOCD: CPT | Performed by: INTERNAL MEDICINE

## 2024-10-14 PROCEDURE — 3074F SYST BP LT 130 MM HG: CPT | Performed by: INTERNAL MEDICINE

## 2024-10-14 RX ORDER — FENOFIBRATE 145 MG/1
145 TABLET, FILM COATED ORAL DAILY
Qty: 30 TABLET | Refills: 11 | Status: SHIPPED | OUTPATIENT
Start: 2024-10-14 | End: 2025-10-14

## 2024-10-14 ASSESSMENT — ENCOUNTER SYMPTOMS
OCCASIONAL FEELINGS OF UNSTEADINESS: 0
LOSS OF SENSATION IN FEET: 0
DEPRESSION: 0

## 2024-10-14 ASSESSMENT — LIFESTYLE VARIABLES
HOW MANY STANDARD DRINKS CONTAINING ALCOHOL DO YOU HAVE ON A TYPICAL DAY: PATIENT DOES NOT DRINK
AUDIT TOTAL SCORE: 0
HAS A RELATIVE, FRIEND, DOCTOR, OR ANOTHER HEALTH PROFESSIONAL EXPRESSED CONCERN ABOUT YOUR DRINKING OR SUGGESTED YOU CUT DOWN: NO
SKIP TO QUESTIONS 9-10: 1
HOW OFTEN DO YOU HAVE A DRINK CONTAINING ALCOHOL: NEVER
AUDIT-C TOTAL SCORE: 0
HOW OFTEN DO YOU HAVE SIX OR MORE DRINKS ON ONE OCCASION: NEVER
HAVE YOU OR SOMEONE ELSE BEEN INJURED AS A RESULT OF YOUR DRINKING: NO

## 2024-10-14 ASSESSMENT — PAIN SCALES - GENERAL: PAINLEVEL: 0-NO PAIN

## 2024-10-14 NOTE — PROGRESS NOTES
History of present illness:  49 year old female patient here today following up on hx of atypical chest pains and abnormal EKG. her initial EKG was showing normal sinus rhythm nonspecific assiduous abnormality suggestive of LVH.. 2D Echo on 04/19/19 was normal with EF of 60-65% and Nuclear stress test in 09/2019 was normal with EF of 53%.  Patient underwent CT calcium score on September 2021 that showed 0 calcium in her arteries. Lung windows were okay.  Patient had 2D echo in February 2024 as a workup for abnormal EKG and vertigo that showed normal ejection fraction no major valve abnormalities.  No clear evidence of LVH.  Patient denies having chest pain shortness of breath palpitations dizziness or syncope.  Patient still complaining of dizziness and vertigo.        Past Medical History:   Diagnosis Date    Angina pectoris 02/22/2024    Disorder of thyroid, unspecified     Thyroid trouble    Dizziness [id number]    Mixed hyperlipidemia 02/22/2024    Other conditions influencing health status     Right handed    Personal history of other diseases of the circulatory system     History of hypertension    Personal history of other specified conditions     History of shortness of breath    Primary hypertension 02/22/2024    Unspecified disorder of nose and nasal sinuses     Sinus trouble       History reviewed. No pertinent surgical history.    Allergies   Allergen Reactions    Penicillins Swelling and Unknown     Facial swelling    Sulfa (Sulfonamide Antibiotics) Unknown        reports that she has never smoked. She has never been exposed to tobacco smoke. She has never used smokeless tobacco. She reports that she does not drink alcohol and does not use drugs.    Family History   Problem Relation Name Age of Onset    Breast cancer Mother  35    Lung cancer Father Jabier     COPD Father Jabier     Emphysema Father Jabier     Heart failure Father Jabier     Prostate cancer Father Jabier        Patient's Medications   New Prescriptions     No medications on file   Previous Medications    ALBUTEROL 90 MCG/ACTUATION INHALER    Inhale 2 puffs 4 times a day.    ASPIRIN 81 MG EC TABLET    Take 1 tablet (81 mg) by mouth once daily.    AZELASTINE (ASTELIN) 137 MCG (0.1 %) NASAL SPRAY    Administer into affected nostril(s) every 12 hours.    BENZONATATE (TESSALON) 200 MG CAPSULE    Take 1 capsule (200 mg) by mouth 3 times a day as needed for cough. Do not crush or chew.    GEMFIBROZIL (LOPID) 600 MG TABLET    Take 1 tablet (600 mg) by mouth 2 times a day.    GUAIFENESIN (MUCINEX) 600 MG 12 HR TABLET    Take 2 tablets (1,200 mg) by mouth 2 times a day as needed for cough or congestion. Do not crush, chew, or split.    LEVOTHYROXINE (SYNTHROID, LEVOXYL) 100 MCG TABLET    TAKE 1 TABLET BY MOUTH EVERY DAY IN THE MORNING ON EMPTY STOMACH FOR 90 DAYS    METFORMIN  MG 24 HR TABLET    TAKE 1 TABLET (500 MG) BY MOUTH ONCE DAILY IN THE EVENING WITH MEALS. DO NOT CRUSH, CHEW, OR SPLIT.    METOPROLOL TARTRATE (LOPRESSOR) 25 MG TABLET    Take 1 tablet (25 mg) by mouth 2 times a day.    MOMETASONE (ELOCON) 0.1 % CREAM    1 Application.    OMEPRAZOLE (PRILOSEC) 20 MG DR CAPSULE    TAKE 1 CAPSULE BY MOUTH EVERY DAY    SERTRALINE (ZOLOFT) 100 MG TABLET    TAKE 1 TABLET BY MOUTH EVERY DAY FOR 90 DAYS   Modified Medications    No medications on file   Discontinued Medications    No medications on file       Objective   Physical Exam  General: Patient in no acute distress   HEENT: Atraumatic normocephalic.  Neck: Supple, jugular venous pressure within normal limit.  No bruits  Lungs: Clear to auscultation bilaterally  Cardiovascular: Regular rate and rhythm, normal heart sounds, no murmurs rubs or gallops  Abdomen: Soft nontender nondistended.  Normal bowel sounds.  Extremities: Warm to touch, no edema.    Lab Review   No visits with results within 2 Month(s) from this visit.   Latest known visit with results is:   Lab on 06/25/2024   Component Date Value    Hemoglobin  A1C 06/25/2024 5.7 (H)     Estimated Average Glucose 06/25/2024 117     Glucose 06/25/2024 93     Sodium 06/25/2024 140     Potassium 06/25/2024 4.5     Chloride 06/25/2024 101     Bicarbonate 06/25/2024 27     Urea Nitrogen 06/25/2024 21     Creatinine 06/25/2024 1.10     eGFR 06/25/2024 61     Calcium 06/25/2024 9.8     Anion Gap 06/25/2024 12         Assessment/Plan   Patient Active Problem List   Diagnosis    Acute back pain with sciatica    Acute upper respiratory infection    Angina pectoris    Anxiety    Carpal tunnel syndrome of left wrist    Cough, unspecified    Cystitis    Dizziness and giddiness    Eczema    Gastroesophageal reflux disease    Hand pain    Hypothyroidism    Kidney stone    Lightheadedness    Mixed hyperlipidemia    Obstructive sleep apnea syndrome    Otitis externa    Pain, unspecified    Primary hypertension    Rheumatoid arthritis    Seasonal allergic rhinitis    Shortness of breath    Venous thrombosis    Prediabetes    Vertigo      49 year old female patient here today following up on hx of atypical chest pains and abnormal EKG. her initial EKG was showing normal sinus rhythm nonspecific assiduous abnormality suggestive of LVH.. 2D Echo on 04/19/19 was normal with EF of 60-65% and Nuclear stress test in 09/2019 was normal with EF of 53%.  Patient underwent CT calcium score on September 2021 that showed 0 calcium in her arteries. Lung windows were okay.  Patient had 2D echo in February 2024 as a workup for abnormal EKG and vertigo that showed normal ejection fraction no major valve abnormalities.  No clear evidence of LVH.  Patient denies having chest pain shortness of breath palpitations dizziness or syncope.  Patient still complaining of dizziness and vertigo.  Her symptoms are related more to central or benign positional vertigo.  Had MRI of the brain which was negative.  Carotid duplex showed right vertebral artery high velocities seen by vascular surgery Dr. Swann.  At this point  from cardiac standpoint she appears stable.  I would like to switch her Lopid to fenofibrate.  Otherwise she stable from my standpoint we will follow-up in 6 months.      Sebastian Cintron MD

## 2024-10-17 ASSESSMENT — ACTIVITIES OF DAILY LIVING (ADL): POOR_BALANCE: 1

## 2024-10-17 NOTE — PROGRESS NOTES
"Physical Therapy Evaluation and Treatment     Patient Name: Shanti Gonsales  MRN: [id number]  Encounter date: 10/18/2024  Time Calculation  Start Time: 0945  Stop Time: 1030  Time Calculation (min): 45 min  PT Evaluation Time Entry  PT Evaluation (Moderate) Time Entry: 30  PT Therapeutic Procedures Time Entry  Neuromuscular Re-Education Time Entry: 15    Visit # 1 of 10  Visits/Dates Authorized: 20V    Current Problem:   Problem List Items Addressed This Visit             ICD-10-CM    Vertigo R42    Relevant Orders    Follow Up In Physical Therapy     Other Visit Diagnoses         Codes    Vestibular disorder     H81.90    Relevant Orders    Follow Up In Physical Therapy          Precautions:          Subjective Evaluation    History of Present Illness  Date of onset: 10/2/2024  Mechanism of injury: 50 yo female presents with c/o gait instability and dizziness. Pt was seen by Dr. Koroma in April and more recently by Dr. Vu and was diagnosed with BPPV and vestibular PT. Pt had an otolith repositioning maneuver at the ENT office. Pt had vestibular PT at a different site and wasn't seeing much improvement. States when she moves her head side to side she will get dizzy. Even sitting she feels \"off.\" Symptoms started over a year ago when she had vertigo for several hours. Went to ED. Pt went to vestibular PT earlier this year. PT thought pt needed to be evaluated by ENT and neurologist. Pt has had brain MRI, ENT testing, and seen by neurologist who cleared her. Pt referred back to vestibular PT due to dizziness.     Quality of life: good    Pain  Current pain ratin  At best pain ratin  At worst pain ratin    Diagnostic Tests  MRI studies: normal (IMPRESSION: Essentially unremarkable MRI of the brain.)  Carotid study: normal (No evidence of hemodynamically significant stenosis in the right subclavian artery.  No evidence of hemodynamically significant stenosis in the left subclavian " artery.)    Treatments  Previous treatment: physical therapy  Patient Goals  Patient goals for therapy: improved balance and return to sport/leisure activities  Patient goal: reduce dizziness       Pain Assessment: 0-10  0-10 (Numeric) Pain Score: 0 - No pain  Objective     Postural Observations  Seated posture: fair  Standing posture: fair      Ambulation     Observational Gait   Gait: within functional limits   Walking speed and stride length within functional limits.         Positionals (tested without goggles)  Ernesto-Hallpike R: positive for torsional, upbeating nystagmus which fatigued in 6 sec, positive vertigo  Hudson-Hallpike L: negative for nystagmus and vertigo  Horizontal roll test R: negative for nystagmus and vertigo  Horizontal roll test L: negative for nystagmus and vertigo  Deep Head Hang: negative for nystagmus and vertigo          Romberg: Firm Eyes Open 30 sec, Eyes Closed 30 sec , Foam Eyes Open 30 sec, Eyes Closed 9 sec   Other Measures  Dizziness Handicap Inventory: 60% impairment    Treatments:     Therapeutic Exercise 68341:     Neuromuscular Re-Ed 13307:   Epley Maneuver for R ear  Access Code: [email or web address]  URL: [email or web address]  Date: 10/18/2024  Prepared by: Maximilian Shirley    Exercises  - Seated Vertical Smooth Pursuit  - 2 x daily - 2 sets - 15 reps  - Seated Horizontal Smooth Pursuit  - 2 x daily - 2 sets - 15 reps  - Seated Gaze Stabilization with Head Rotation  - 2 x daily - 2 sets - 15 reps  - Seated Gaze Stabilization with Head Nod  - 2 x daily - 2 sets - 15 reps  - Seated Proximal-Distal Smooth Pursuit  - 2 x daily - 2 sets - 15 reps  - Vestibular Habituation - Seated Horizontal Head Rotation  - 2 x daily - 2 sets - 15 reps  - Seated Right Head Turns Vestibular Habituation  - 2 x daily - 2 sets - 5 reps  - Seated Left Head Turns Vestibular Habituation  - 2 x daily - 2 sets - 5 reps    Therapeutic Activity 99496:    Gait Training 78688:     Manual Therapy 99025:     Modalities:      HEP / Access Codes:       Assessment & Plan     Assessment  Impairments: abnormal coordination, abnormal gait, activity intolerance, impaired balance, impaired physical strength, lacks appropriate home exercise program, pain with function and safety issue  Assessment details: Patient presents with unsteady gait and dizziness. Key impairments include: decreased ROM and strength, poor balance and compensatory gait patterning, and high fall risk. Patient would benefit from skilled physical therapy services to address these impairments and restore normal ROM and strength to reduce pain and improving activity participation.    Prognosis: good    Goals  reduce dizziness    Plan  Therapy options: will be seen for skilled physical therapy services  Planned therapy interventions: stretching, strengthening, motor coordination training, balance/weight-bearing training, neuromuscular re-education, flexibility, functional ROM exercises, gait training, home exercise program, transfer training, therapeutic activities and fine motor coordination training  Frequency: 2x week  Duration in visits: 10  Duration in weeks: 12  Treatment plan discussed with: patient       Moderate complexity due to patient's clinical presentation being evolving with changing characteristics, with comorbidities/complexities to include chronic dizziness > 1 year, HTN, and thyroid disorder, all of which may negatively impact rehab tolerance and progression.     Post-Treatment Symptoms:  Response to Interventions: 0    Goals:   Active       PT Problem       PT Goal 1       Start:  10/18/24    Expected End:  01/16/25         Pt will be able to drive for 30 minutes without exacerbation of symptoms.    Pt will be able to complete 30 minutes of grocery shopping without exacerbation of symptoms.    Pt will be able to read for 30 minutes without exacerbation of symptoms.    Pt will be able to complete single leg balance for 5 seconds on L/R LE to allow pt to  don pants safely.    Pt will be able to stand with eyes closed for 30 seconds to allow pt to wash hair in shower safely.     Pt will improve Dizziness Handicap Index to > 80% function to allow pt to walk in crowded room without symptom exacerbation.

## 2024-10-18 ENCOUNTER — EVALUATION (OUTPATIENT)
Dept: PHYSICAL THERAPY | Facility: CLINIC | Age: 49
End: 2024-10-18
Payer: COMMERCIAL

## 2024-10-18 DIAGNOSIS — R42 VERTIGO: ICD-10-CM

## 2024-10-18 DIAGNOSIS — H81.90 VESTIBULAR DISORDER: ICD-10-CM

## 2024-10-18 PROCEDURE — 97112 NEUROMUSCULAR REEDUCATION: CPT | Mod: GP

## 2024-10-18 PROCEDURE — 97162 PT EVAL MOD COMPLEX 30 MIN: CPT | Mod: GP

## 2024-10-18 SDOH — ECONOMIC STABILITY: GENERAL: QUALITY OF LIFE: GOOD

## 2024-10-18 ASSESSMENT — ENCOUNTER SYMPTOMS
PAIN SCALE: 0
PAIN SCALE AT HIGHEST: 0
PAIN SCALE AT LOWEST: 0

## 2024-10-18 ASSESSMENT — PAIN - FUNCTIONAL ASSESSMENT: PAIN_FUNCTIONAL_ASSESSMENT: 0-10

## 2024-10-18 ASSESSMENT — PAIN SCALES - GENERAL: PAINLEVEL_OUTOF10: 0 - NO PAIN

## 2024-11-01 ENCOUNTER — TREATMENT (OUTPATIENT)
Dept: PHYSICAL THERAPY | Facility: CLINIC | Age: 49
End: 2024-11-01
Payer: COMMERCIAL

## 2024-11-01 DIAGNOSIS — H81.90 VESTIBULAR DISORDER: ICD-10-CM

## 2024-11-01 DIAGNOSIS — R42 VERTIGO: ICD-10-CM

## 2024-11-01 PROCEDURE — 97112 NEUROMUSCULAR REEDUCATION: CPT | Mod: GP

## 2024-11-01 PROCEDURE — 95992 CANALITH REPOSITIONING PROC: CPT | Mod: GP

## 2024-11-01 ASSESSMENT — PAIN SCALES - GENERAL: PAINLEVEL_OUTOF10: 0 - NO PAIN

## 2024-11-01 ASSESSMENT — PAIN - FUNCTIONAL ASSESSMENT: PAIN_FUNCTIONAL_ASSESSMENT: 0-10

## 2024-11-06 ENCOUNTER — TELEPHONE (OUTPATIENT)
Dept: PRIMARY CARE | Facility: CLINIC | Age: 49
End: 2024-11-06
Payer: COMMERCIAL

## 2024-11-07 DIAGNOSIS — R73.03 PREDIABETES: ICD-10-CM

## 2024-11-07 RX ORDER — METFORMIN HYDROCHLORIDE 500 MG/1
500 TABLET, EXTENDED RELEASE ORAL
Qty: 8 TABLET | Refills: 0 | Status: SHIPPED | OUTPATIENT
Start: 2024-11-07 | End: 2024-11-15

## 2024-11-07 NOTE — TELEPHONE ENCOUNTER
PT IS IN [place] AND FORGOT TO PACK METFORMIN REQUESTING REFILL FOR 8 DAYS [facility] [address] [facility] [phone]

## 2024-11-13 ENCOUNTER — APPOINTMENT (OUTPATIENT)
Dept: PHYSICAL THERAPY | Facility: CLINIC | Age: 49
End: 2024-11-13
Payer: COMMERCIAL

## 2024-11-15 ENCOUNTER — TREATMENT (OUTPATIENT)
Dept: PHYSICAL THERAPY | Facility: CLINIC | Age: 49
End: 2024-11-15
Payer: COMMERCIAL

## 2024-11-15 DIAGNOSIS — R42 VERTIGO: ICD-10-CM

## 2024-11-15 DIAGNOSIS — H81.90 VESTIBULAR DISORDER: ICD-10-CM

## 2024-11-15 PROCEDURE — 95992 CANALITH REPOSITIONING PROC: CPT | Mod: GP

## 2024-11-15 PROCEDURE — 97112 NEUROMUSCULAR REEDUCATION: CPT | Mod: GP

## 2024-11-15 ASSESSMENT — PAIN SCALES - GENERAL: PAINLEVEL_OUTOF10: 0 - NO PAIN

## 2024-11-15 ASSESSMENT — PAIN - FUNCTIONAL ASSESSMENT: PAIN_FUNCTIONAL_ASSESSMENT: 0-10

## 2024-11-15 NOTE — PROGRESS NOTES
Physical Therapy Treatment    Patient Name: Shanti Gonsales  MRN: [id number]  Encounter date: 11/15/2024    Time Calculation  Start Time: 1420  Stop Time: 1500  Time Calculation (min): 40 min  PT Modalities Time Entry  Canalith Repositioning Time Entry: 10  PT Therapeutic Procedures Time Entry  Neuromuscular Re-Education Time Entry: 30    Visit # 3 of 10  Visits/Dates Authorized: [facility] - NO AUTH / $2500 DEDUCT-MET / $6500 OOP not met / 80% coins / 20V     Current Problem:   Problem List Items Addressed This Visit             ICD-10-CM    Vertigo R42     Other Visit Diagnoses         Codes    Vestibular disorder     H81.90          Precautions:    HTN, thyroid disorder       Subjective   General:    Pt states she had some really good days recently however this morning turning over in bed she had some dizziness/vertigo.     Pre-Treatment Symptoms:   Pain Assessment: 0-10  0-10 (Numeric) Pain Score: 0 - No pain    Objective   Findings:   Vitals:     11/15/24 Positionals (tested without goggles)  Horizontal roll test L: negative for nystagmus and vertigo  Horizontal roll test R: negative for nystagmus and vertigo  Deep Head Hang: positive vertigo, 1-2 seconds, no nystagmus observed; returned upright via Yacovino maneuver  Loaded Ernesto-Hallpike L: negative for nystagmus and vertigo  Loaded Ernesto-Hallpike R: positive for upbeating/torsional nystagmus which fatigued in 1-2 sec, positive vertigo    7/22/24   CONCLUSIONS:  Right Carotid: The right proximal internal carotid artery is normal. Right external carotid artery appears patent with no evidence of stenosis. The right vertebral artery demonstrates a high resistance waveform which may be suggestive of a more distal stenosis or occlusion. No evidence of hemodynamically significant stenosis in the right subclavian artery.  Left Carotid: The left proximal internal carotid artery is normal. Left external carotid artery appears patent with no evidence of stenosis. The left  "vertebral artery is patent with antegrade flow. No evidence of hemodynamically significant stenosis in the left subclavian artery.  Additional Findings:  Technically difficult exam due to body habitus and high carotid bifurcation.       6/19/24 VNG: IMPRESSIONS  Abnormal Videonystagmography VNG evaluation. There is no evidence of central vestibular system disorder. There is evidence of peripheral or central vestibular system disorder.  Bithermal caloric testing yielded symmetrical responses, and the directional preponderance is not significant. Oculomotor testing to visually guided signals was determined to be normal. 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      Treatments:   CRT:  11/1/24: Yacovino maneuver, then R Epley maneuver    10/18/24 Epley maneuver R posterior canal       Therapeutic Exercise 42760:     Neuromuscular Re-Ed 14505:     X30\" each  Seated smooth pursuits L/R, up/down  VOR L/R, up/down  Standing narrow BLESSING w/ head turns L/R, up/down  Habituation quick head turns L/R, up/down  Walking with head turns L/R, up/down  Walking with 360 turns to R x2, x2 trials      Therapeutic Activity 89676:      Gait Training 98097:       Manual Therapy 94110:       Modalities:       HEP / Access Codes:   Access Code: [email or web address] Date: 10/18/2024  - Seated Vertical Smooth Pursuit  - 2 x daily - 2 sets - 15 reps  - Seated Horizontal Smooth Pursuit  - 2 x daily - 2 sets - 15 reps  - Seated Gaze Stabilization with Head Rotation  - 2 x daily - 2 sets - 15 reps  - Seated Gaze Stabilization with Head Nod  - 2 x daily - 2 sets - 15 reps  - Seated Proximal-Distal Smooth Pursuit  - 2 x daily - 2 sets - 15 reps  - Vestibular Habituation - Seated Horizontal Head Rotation  - 2 x daily - 2 sets - 15 reps  - Seated Right Head Turns Vestibular Habituation  - 2 x daily - 2 " sets - 5 reps  - Seated Left Head Turns Vestibular Habituation  - 2 x daily - 2 sets - 5 reps      Assessment:    Pt still having some (+) positional vertigo in the posterior and anterior canals. Pt did well with repositioning maneuvers. Pt reported no dizziness with vestibular exercises after repositioning maneuvers.     Post-Treatment Symptoms:    Response to Interventions: No change in pain     Plan:    Recheck for clearance of canaliths then attempt habituation.    Goals:   Active       PT Problem       PT Goal 1       Start:  10/18/24    Expected End:  01/16/25         Pt will be able to drive for 30 minutes without exacerbation of symptoms.    Pt will be able to complete 30 minutes of grocery shopping without exacerbation of symptoms.    Pt will be able to read for 30 minutes without exacerbation of symptoms.    Pt will be able to complete single leg balance for 5 seconds on L/R LE to allow pt to don pants safely.    Pt will be able to stand with eyes closed for 30 seconds to allow pt to wash hair in shower safely.     Pt will improve Dizziness Handicap Index to > 80% function to allow pt to walk in crowded room without symptom exacerbation.

## 2024-11-19 ENCOUNTER — TREATMENT (OUTPATIENT)
Dept: PHYSICAL THERAPY | Facility: CLINIC | Age: 49
End: 2024-11-19
Payer: COMMERCIAL

## 2024-11-19 DIAGNOSIS — R42 VERTIGO: ICD-10-CM

## 2024-11-19 DIAGNOSIS — H81.90 VESTIBULAR DISORDER: ICD-10-CM

## 2024-11-19 PROCEDURE — 97140 MANUAL THERAPY 1/> REGIONS: CPT | Mod: GP | Performed by: PHYSICAL THERAPIST

## 2024-11-19 PROCEDURE — 97112 NEUROMUSCULAR REEDUCATION: CPT | Mod: GP | Performed by: PHYSICAL THERAPIST

## 2024-11-19 ASSESSMENT — PAIN - FUNCTIONAL ASSESSMENT: PAIN_FUNCTIONAL_ASSESSMENT: 0-10

## 2024-11-19 ASSESSMENT — PAIN SCALES - GENERAL: PAINLEVEL_OUTOF10: 0 - NO PAIN

## 2024-11-19 NOTE — PROGRESS NOTES
Physical Therapy Treatment    Patient Name: Shanti Gonsales  MRN: [id number]  Encounter date: 11/19/2024 PT Received On: 11/19/24  Time Calculation  Start Time: 1345  Stop Time: 1430  Time Calculation (min): 45 min  PT Therapeutic Procedures Time Entry  Manual Therapy Time Entry: 12  Neuromuscular Re-Education Time Entry: 30    Visit # 4 of 10  Visits/Dates Authorized: [facility] - NO AUTH / $2500 DEDUCT-MET / $6500 OOP not met / 80% coins / 20V     Current Problem:   Problem List Items Addressed This Visit             ICD-10-CM    Vertigo R42     Other Visit Diagnoses         Codes    Vestibular disorder     H81.90          Precautions:    HTN, thyroid disorder       Subjective   General:    Pt states she just feels pretty off today. Limits rolling in bed. Lays on her Left side, wants to lay on her R side but she is afraid to roll that way.     Pre-Treatment Symptoms:   Pain Assessment: 0-10  0-10 (Numeric) Pain Score: 0 - No painHeadaches are new    Objective   Findings:   Vitals:     11/15/24 Positionals (tested without goggles)  Horizontal roll test L: negative for nystagmus and vertigo  Horizontal roll test R: negative for nystagmus and vertigo  Deep Head Hang: positive vertigo, 1-2 seconds, no nystagmus observed; returned upright via Yacovino maneuver  Loaded Ernesto-Hallpike L: negative for nystagmus and vertigo  Loaded Ernesto-Hallpike R: positive for upbeating/torsional nystagmus which fatigued in 1-2 sec, positive vertigo    7/22/24   CONCLUSIONS:  Right Carotid: The right proximal internal carotid artery is normal. Right external carotid artery appears patent with no evidence of stenosis. The right vertebral artery demonstrates a high resistance waveform which may be suggestive of a more distal stenosis or occlusion. No evidence of hemodynamically significant stenosis in the right subclavian artery.  Left Carotid: The left proximal internal carotid artery is normal. Left external carotid artery appears patent with no  "evidence of stenosis. The left vertebral artery is patent with antegrade flow. No evidence of hemodynamically significant stenosis in the left subclavian artery.  Additional Findings:  Technically difficult exam due to body habitus and high carotid bifurcation.       6/19/24 VNG: IMPRESSIONS  Abnormal Videonystagmography VNG evaluation. There is no evidence of central vestibular system disorder. There is evidence of peripheral or central vestibular system disorder.  Bithermal caloric testing yielded symmetrical responses, and the directional preponderance is not significant. Oculomotor testing to visually guided signals was determined to be normal. 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      Treatments:   CRT:  11/1/24: Yacovino maneuver, then R Epley maneuver    10/18/24 Epley maneuver R posterior canal       Therapeutic Exercise 38244:     Neuromuscular Re-Ed 65324:     X30\" each  Seated smooth pursuits L/R, up/down, standing on foam  VOR L/R, up/down  Standing narrow BLESSING w/ head turns L/R, up/down, diagonals- on foam  Habituation quick head turns L/R, up/down  Walking with head turns L/R, up/down  Walking with 360 turns to R x2, x2 trials  Bending low to high cone retrieval and stacking, bent over stacking 10 cones before standing up      Therapeutic Activity 21299:      Gait Training 24394:       Manual Therapy 15517:   OA release, STM to cervical PS, upper trap trigger point release, gentle traction, upper trap stretch, lev scap stretch, upper cerv flexion and rotation, down glides      Modalities:       HEP / Access Codes:   Access Code: [email or web address] Date: 10/18/2024  - Seated Vertical Smooth Pursuit  - 2 x daily - 2 sets - 15 reps  - Seated Horizontal Smooth Pursuit  - 2 x daily - 2 sets - 15 reps  - Seated Gaze Stabilization with Head Rotation  - 2 " x daily - 2 sets - 15 reps  - Seated Gaze Stabilization with Head Nod  - 2 x daily - 2 sets - 15 reps  - Seated Proximal-Distal Smooth Pursuit  - 2 x daily - 2 sets - 15 reps  - Vestibular Habituation - Seated Horizontal Head Rotation  - 2 x daily - 2 sets - 15 reps  - Seated Right Head Turns Vestibular Habituation  - 2 x daily - 2 sets - 5 reps  - Seated Left Head Turns Vestibular Habituation  - 2 x daily - 2 sets - 5 reps      Assessment:    Pt still having some (+) positional vertigo in the posterior and anterior canals. Pt did well with repositioning maneuvers. Pt reported no dizziness with vestibular exercises after repositioning maneuvers.     Post-Treatment Symptoms:      No pain, no dizziness    Plan:    Recheck for clearance of canaliths then attempt habituation.    Goals:   Active       PT Problem       PT Goal 1       Start:  10/18/24    Expected End:  01/16/25         Pt will be able to drive for 30 minutes without exacerbation of symptoms.    Pt will be able to complete 30 minutes of grocery shopping without exacerbation of symptoms.    Pt will be able to read for 30 minutes without exacerbation of symptoms.    Pt will be able to complete single leg balance for 5 seconds on L/R LE to allow pt to don pants safely.    Pt will be able to stand with eyes closed for 30 seconds to allow pt to wash hair in shower safely.     Pt will improve Dizziness Handicap Index to > 80% function to allow pt to walk in crowded room without symptom exacerbation.

## 2024-11-21 ENCOUNTER — APPOINTMENT (OUTPATIENT)
Dept: PHYSICAL THERAPY | Facility: CLINIC | Age: 49
End: 2024-11-21
Payer: COMMERCIAL

## 2024-11-25 ENCOUNTER — TREATMENT (OUTPATIENT)
Dept: PHYSICAL THERAPY | Facility: CLINIC | Age: 49
End: 2024-11-25
Payer: COMMERCIAL

## 2024-11-25 DIAGNOSIS — H81.90 VESTIBULAR DISORDER: ICD-10-CM

## 2024-11-25 DIAGNOSIS — R42 VERTIGO: ICD-10-CM

## 2024-11-25 PROCEDURE — 97112 NEUROMUSCULAR REEDUCATION: CPT | Mod: GP | Performed by: PHYSICAL THERAPIST

## 2024-11-25 PROCEDURE — 97110 THERAPEUTIC EXERCISES: CPT | Mod: GP | Performed by: PHYSICAL THERAPIST

## 2024-11-25 NOTE — PROGRESS NOTES
Physical Therapy Treatment    Patient Name: Shanti Gonsales  MRN: [id number]  Encounter date: 11/25/2024 PT Received On: 11/25/24  Time Calculation  Start Time: 1400  Stop Time: 1445  Time Calculation (min): 45 min  PT Therapeutic Procedures Time Entry  Neuromuscular Re-Education Time Entry: 34  Therapeutic Exercise Time Entry: 10    Visit # 5 of 10  Visits/Dates Authorized: [facility] - NO AUTH / $2500 DEDUCT-MET / $6500 OOP not met / 80% coins / 20V     Current Problem:   Problem List Items Addressed This Visit             ICD-10-CM    Vertigo R42     Other Visit Diagnoses         Codes    Vestibular disorder     H81.90          Precautions:    HTN, thyroid disorder       Subjective   General:    Pt states she just feels pretty off today. Limits rolling in bed. Lays on her Left side, wants to lay on her R side but she is afraid to roll that way.     Pre-Treatment Symptoms:    Headaches are new    Objective   Findings:   Vitals:     11/15/24 Positionals (tested without goggles)  Horizontal roll test L: negative for nystagmus and vertigo  Horizontal roll test R: negative for nystagmus and vertigo  Deep Head Hang: positive vertigo, 1-2 seconds, no nystagmus observed; returned upright via Yacovino maneuver  Loaded Ernesto-Hallpike L: negative for nystagmus and vertigo  Loaded [place]-Hallpike R: positive for upbeating/torsional nystagmus which fatigued in 1-2 sec, positive vertigo    7/22/24   CONCLUSIONS:  Right Carotid: The right proximal internal carotid artery is normal. Right external carotid artery appears patent with no evidence of stenosis. The right vertebral artery demonstrates a high resistance waveform which may be suggestive of a more distal stenosis or occlusion. No evidence of hemodynamically significant stenosis in the right subclavian artery.  Left Carotid: The left proximal internal carotid artery is normal. Left external carotid artery appears patent with no evidence of stenosis. The left vertebral artery is  "patent with antegrade flow. No evidence of hemodynamically significant stenosis in the left subclavian artery.  Additional Findings:  Technically difficult exam due to body habitus and high carotid bifurcation.       6/19/24 VNG: IMPRESSIONS  Abnormal Videonystagmography VNG evaluation. There is no evidence of central vestibular system disorder. There is evidence of peripheral or central vestibular system disorder.  Bithermal caloric testing yielded symmetrical responses, and the directional preponderance is not significant. Oculomotor testing to visually guided signals was determined to be normal. 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      Treatments:   CRT:  11/1/24: Yacovino maneuver, then R Epley maneuver    10/18/24 Epley maneuver R posterior canal       Therapeutic Exercise 78615:   Bike 5min  Snags neck ext, rotation 8x5s- added to HEP, as well as chin tucks and UT stretch and lev scap    Neuromuscular Re-Ed 83573:   Firm SLS taps front back to targets  Tandem balance firm, tandem gait firm  X30\" each  Seated smooth pursuits L/R, up/down, standing on foam  VOR L/R, up/down  Standing narrow BLESSING w/ head turns L/R, up/down, diagonals- on foam  Habituation quick head turns L/R, up/down  Walking with head turns L/R, up/down  Walking with 360 turns to R x2, x2 trials  Bending low to high cone retrieval and stacking, bent over stacking 10 cones before standing up    Manual Therapy 36969:   OA release, STM to cervical PS, upper trap trigger point release, gentle traction, upper trap stretch, lev scap stretch, upper cerv flexion and rotation, down glides      Modalities:   PRN- dry needle or traction for dizziness     HEP / Access Codes:   Access Code: [email or web address] Date: 10/18/2024  - Seated Vertical Smooth Pursuit  - 2 x daily - 2 sets - 15 reps  - Seated " Horizontal Smooth Pursuit  - 2 x daily - 2 sets - 15 reps  - Seated Gaze Stabilization with Head Rotation  - 2 x daily - 2 sets - 15 reps  - Seated Gaze Stabilization with Head Nod  - 2 x daily - 2 sets - 15 reps  - Seated Proximal-Distal Smooth Pursuit  - 2 x daily - 2 sets - 15 reps  - Vestibular Habituation - Seated Horizontal Head Rotation  - 2 x daily - 2 sets - 15 reps  - Seated Right Head Turns Vestibular Habituation  - 2 x daily - 2 sets - 5 reps  - Seated Left Head Turns Vestibular Habituation  - 2 x daily - 2 sets - 5 reps      Assessment:    Pt still having some (+) positional vertigo in the posterior and anterior canals. Pt did well with repositioning maneuvers. Pt reported no dizziness with vestibular exercises after repositioning maneuvers.     Post-Treatment Symptoms:      No pain, no dizziness    Plan:    Recheck for clearance of canaliths then attempt habituation.    Goals:   Active       PT Problem       PT Goal 1       Start:  10/18/24    Expected End:  01/16/25         Pt will be able to drive for 30 minutes without exacerbation of symptoms.    Pt will be able to complete 30 minutes of grocery shopping without exacerbation of symptoms.    Pt will be able to read for 30 minutes without exacerbation of symptoms.    Pt will be able to complete single leg balance for 5 seconds on L/R LE to allow pt to don pants safely.    Pt will be able to stand with eyes closed for 30 seconds to allow pt to wash hair in shower safely.     Pt will improve Dizziness Handicap Index to > 80% function to allow pt to walk in crowded room without symptom exacerbation.

## 2024-11-27 ENCOUNTER — DOCUMENTATION (OUTPATIENT)
Dept: PHYSICAL THERAPY | Facility: CLINIC | Age: 49
End: 2024-11-27
Payer: COMMERCIAL

## 2024-11-27 NOTE — PROGRESS NOTES
Physical Therapy                 Therapy Communication Note    Patient Name: Shanti Gonsales  MRN: [id number]  Encounter Date: 11/27/2024    Discipline: Physical Therapy    Missed Time: No Show    Comment:

## 2024-12-04 ENCOUNTER — TREATMENT (OUTPATIENT)
Dept: PHYSICAL THERAPY | Facility: CLINIC | Age: 49
End: 2024-12-04
Payer: COMMERCIAL

## 2024-12-04 DIAGNOSIS — R42 VERTIGO: ICD-10-CM

## 2024-12-04 DIAGNOSIS — H81.90 VESTIBULAR DISORDER: ICD-10-CM

## 2024-12-04 PROCEDURE — 97112 NEUROMUSCULAR REEDUCATION: CPT | Mod: GP

## 2024-12-04 PROCEDURE — 97110 THERAPEUTIC EXERCISES: CPT | Mod: GP

## 2024-12-04 ASSESSMENT — PAIN SCALES - GENERAL: PAINLEVEL_OUTOF10: 7

## 2024-12-04 ASSESSMENT — PAIN - FUNCTIONAL ASSESSMENT: PAIN_FUNCTIONAL_ASSESSMENT: 0-10

## 2024-12-04 ASSESSMENT — PAIN DESCRIPTION - DESCRIPTORS: DESCRIPTORS: ACHING

## 2024-12-04 NOTE — PROGRESS NOTES
Physical Therapy Treatment    Patient Name: Shanti Gonsales  MRN: [id number]  Encounter date: 12/4/2024    Time Calculation  Start Time: 1405  Stop Time: 1500  Time Calculation (min): 55 min  PT Therapeutic Procedures Time Entry  Neuromuscular Re-Education Time Entry: 20  Therapeutic Exercise Time Entry: 20    Visit # 6 of 10  Visits/Dates Authorized: [facility] - NO AUTH / $2500 DEDUCT-MET / $6500 OOP not met / 80% coins / 20V     Current Problem:   Problem List Items Addressed This Visit             ICD-10-CM    Vertigo R42     Other Visit Diagnoses         Codes    Vestibular disorder     H81.90          Precautions:    HTN, thyroid disorder       Subjective   General:    Pt states she is having severe RLE pain. Pain is currently in her outer R hip and radiates across her back and groin. Pt states her RLE will give out on her at times.     Pre-Treatment Symptoms:   Pain Assessment: 0-10  0-10 (Numeric) Pain Score: 7  Pain Location: Hip  Pain Radiating Towards: buttock/low back/groin  Pain Descriptors: Aching  Pain Frequency: Constant/continuous  Pain Onset: Ongoing    Objective   Findings:   12/4/24: repeated extension: reduced pain  Antalgic gait pattern  Tenderness R TFL, cluneals and lumbar paraspinals at L4/L5  (-) SLR L/R     11/15/24 Positionals (tested without goggles)  Horizontal roll test L: negative for nystagmus and vertigo  Horizontal roll test R: negative for nystagmus and vertigo  Deep Head Hang: positive vertigo, 1-2 seconds, no nystagmus observed; returned upright via Yacovino maneuver  Loaded Ernesto-Hallpike L: negative for nystagmus and vertigo  Loaded Ernesto-Hallpike R: positive for upbeating/torsional nystagmus which fatigued in 1-2 sec, positive vertigo    7/22/24 US  CONCLUSIONS:  Right Carotid: The right proximal internal carotid artery is normal. Right external carotid artery appears patent with no evidence of stenosis. The right vertebral artery demonstrates a high resistance waveform which may be  suggestive of a more distal stenosis or occlusion. No evidence of hemodynamically significant stenosis in the right subclavian artery.  Left Carotid: The left proximal internal carotid artery is normal. Left external carotid artery appears patent with no evidence of stenosis. The left vertebral artery is patent with antegrade flow. No evidence of hemodynamically significant stenosis in the left subclavian artery.  Additional Findings:  Technically difficult exam due to body habitus and high carotid bifurcation.       6/19/24 VNG: IMPRESSIONS  Abnormal Videonystagmography VNG evaluation. There is no evidence of central vestibular system disorder. There is evidence of peripheral or central vestibular system disorder.  Bithermal caloric testing yielded symmetrical responses, and the directional preponderance is not significant. Oculomotor testing to visually guided signals was determined to be normal. 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      Treatments:       Therapeutic Exercise 70846:   PPT x 10  LTR x 10  Piriformis stretch L/R x2  Prone press ups x 10  Standing back extension    Neuromuscular Re-Ed 49098:   Seated smooth pursuits L/R, up/down, standing on foam  VOR L/R, up/down  Habituation quick head turns L/R, up/down    Modalities:   Moist heat for lumbar spine, seated 10 minutes    HEP / Access Codes:   Access Code: [email or web address] Date: 10/18/2024  - Seated Vertical Smooth Pursuit  - 2 x daily - 2 sets - 15 reps  - Seated Horizontal Smooth Pursuit  - 2 x daily - 2 sets - 15 reps  - Seated Gaze Stabilization with Head Rotation  - 2 x daily - 2 sets - 15 reps  - Seated Gaze Stabilization with Head Nod  - 2 x daily - 2 sets - 15 reps  - Seated Proximal-Distal Smooth Pursuit  - 2 x daily - 2 sets - 15 reps  - Vestibular Habituation - Seated Horizontal  Head Rotation  - 2 x daily - 2 sets - 15 reps  - Seated Right Head Turns Vestibular Habituation  - 2 x daily - 2 sets - 5 reps  - Seated Left Head Turns Vestibular Habituation  - 2 x daily - 2 sets - 5 reps    Access Code: [email or web address]  URL: [email or web address]  Date: 12/04/2024  Prepared by: Maximilian Shirley    Exercises  - Supine Posterior Pelvic Tilt  - 1 x daily - 3 x weekly - 2 sets - 15 reps  - Supine Lower Trunk Rotation  - 1 x daily - 3 x weekly - 2 sets - 12 reps  - Supine Sciatic Nerve Glide  - 1 x daily - 3 x weekly - 2 sets - 12 reps  - Supine Piriformis Stretch with Foot on Ground  - 1 x daily - 3 x weekly - 1 sets - 3 reps - 30 hold      Assessment:    Pt presenting with subacute R hip/lumbar pain that has worsened over past three weeks since returning home from [place]. PT screened pt for her pain and provided her with gentle hip/lumbar ROM exercises. Pt encouraged to go to urgent care or see her PCP to address her hip/back pain. Pt reporting less dizziness and vertigo symptoms since starting PT.     Post-Treatment Symptoms:    Response to Interventions: No change in pain     Plan:    Vestibular training.     Goals:   Active       PT Problem       PT Goal 1       Start:  10/18/24    Expected End:  01/16/25         Pt will be able to drive for 30 minutes without exacerbation of symptoms.    Pt will be able to complete 30 minutes of grocery shopping without exacerbation of symptoms.    Pt will be able to read for 30 minutes without exacerbation of symptoms.    Pt will be able to complete single leg balance for 5 seconds on L/R LE to allow pt to don pants safely.    Pt will be able to stand with eyes closed for 30 seconds to allow pt to wash hair in shower safely.     Pt will improve Dizziness Handicap Index to > 80% function to allow pt to walk in crowded room without symptom exacerbation.

## 2024-12-06 ENCOUNTER — APPOINTMENT (OUTPATIENT)
Dept: PHYSICAL THERAPY | Facility: CLINIC | Age: 49
End: 2024-12-06
Payer: COMMERCIAL

## 2024-12-12 ENCOUNTER — APPOINTMENT (OUTPATIENT)
Dept: PHYSICAL THERAPY | Facility: CLINIC | Age: 49
End: 2024-12-12
Payer: COMMERCIAL

## 2024-12-13 ENCOUNTER — OFFICE VISIT (OUTPATIENT)
Dept: PRIMARY CARE | Facility: CLINIC | Age: 49
End: 2024-12-13
Payer: COMMERCIAL

## 2024-12-13 VITALS
SYSTOLIC BLOOD PRESSURE: 138 MMHG | BODY MASS INDEX: 42.29 KG/M2 | DIASTOLIC BLOOD PRESSURE: 86 MMHG | HEART RATE: 70 BPM | OXYGEN SATURATION: 96 % | HEIGHT: 61 IN | WEIGHT: 224 LBS | TEMPERATURE: 96.9 F

## 2024-12-13 DIAGNOSIS — M53.3 ACUTE COCCYGEAL PAIN: ICD-10-CM

## 2024-12-13 DIAGNOSIS — M25.551 RIGHT HIP PAIN: Primary | ICD-10-CM

## 2024-12-13 PROBLEM — J06.9 ACUTE UPPER RESPIRATORY INFECTION: Status: RESOLVED | Noted: 2024-02-22 | Resolved: 2024-12-13

## 2024-12-13 PROBLEM — M06.9 RHEUMATOID ARTHRITIS: Status: RESOLVED | Noted: 2024-02-22 | Resolved: 2024-12-13

## 2024-12-13 PROBLEM — H60.90 OTITIS EXTERNA: Status: RESOLVED | Noted: 2024-02-22 | Resolved: 2024-12-13

## 2024-12-13 PROBLEM — M54.40 ACUTE BACK PAIN WITH SCIATICA: Status: RESOLVED | Noted: 2024-02-22 | Resolved: 2024-12-13

## 2024-12-13 PROCEDURE — 3079F DIAST BP 80-89 MM HG: CPT | Performed by: NURSE PRACTITIONER

## 2024-12-13 PROCEDURE — 3008F BODY MASS INDEX DOCD: CPT | Performed by: NURSE PRACTITIONER

## 2024-12-13 PROCEDURE — 1036F TOBACCO NON-USER: CPT | Performed by: NURSE PRACTITIONER

## 2024-12-13 PROCEDURE — 3075F SYST BP GE 130 - 139MM HG: CPT | Performed by: NURSE PRACTITIONER

## 2024-12-13 PROCEDURE — 99214 OFFICE O/P EST MOD 30 MIN: CPT | Performed by: NURSE PRACTITIONER

## 2024-12-13 RX ORDER — PREDNISONE 20 MG/1
40 TABLET ORAL DAILY
Qty: 10 TABLET | Refills: 0 | Status: SHIPPED | OUTPATIENT
Start: 2024-12-13 | End: 2024-12-18

## 2024-12-13 ASSESSMENT — ENCOUNTER SYMPTOMS
OCCASIONAL FEELINGS OF UNSTEADINESS: 0
CONSTITUTIONAL NEGATIVE: 1
RESPIRATORY NEGATIVE: 1
CARDIOVASCULAR NEGATIVE: 1
GASTROINTESTINAL NEGATIVE: 1
MUSCULOSKELETAL NEGATIVE: 1
DEPRESSION: 0
LOSS OF SENSATION IN FEET: 0

## 2024-12-13 ASSESSMENT — PAIN SCALES - GENERAL: PAINLEVEL_OUTOF10: 3

## 2024-12-13 NOTE — PROGRESS NOTES
"Chief Complaint  Shanti Gonsales is a 49 y.o. female presenting for \"Follow-up (Pt here for right hip pain. Denies any fall or injury. Pt states the pain worsened after returning from [place] in November. Pt has difficulty laying down on the right side. Pt states the pain is constant and is worse with physical activity. ).\"    HPI     Shanti Gonsales is a 49 y.o. female presenting for right hip pain, PT thinks it is stemming from her back. Has been going on since November 14, fell last week on her tailbone on the ice.        Past Medical History  Patient Active Problem List    Diagnosis Date Noted    Vertigo 03/12/2024    Angina pectoris 02/22/2024    Anxiety 02/22/2024    Carpal tunnel syndrome of left wrist 02/22/2024    Cough, unspecified 02/22/2024    Cystitis 02/22/2024    Dizziness and giddiness 02/22/2024    Eczema 02/22/2024    Gastroesophageal reflux disease 02/22/2024    Hand pain 02/22/2024    Kidney stone 02/22/2024    Lightheadedness 02/22/2024    Mixed hyperlipidemia 02/22/2024    Obstructive sleep apnea syndrome 02/22/2024    Primary hypertension 02/22/2024    Shortness of breath 02/22/2024    Venous thrombosis 02/22/2024    Prediabetes 02/22/2024    Hypothyroidism 02/09/2024    Pain, unspecified 02/28/2023    Seasonal allergic rhinitis 08/23/2019        Medications  Current Outpatient Medications   Medication Instructions    albuterol 90 mcg/actuation inhaler 2 puffs, inhalation, 4 times daily RT    aspirin 81 mg, Daily    azelastine (Astelin) 137 mcg (0.1 %) nasal spray Every 12 hours    benzonatate (TESSALON) 200 mg, oral, 3 times daily PRN, Do not crush or chew.    fenofibrate (TRICOR) 145 mg, oral, Daily    guaiFENesin (MUCINEX) 1,200 mg, oral, 2 times daily PRN, Do not crush, chew, or split.    levothyroxine (SYNTHROID, LEVOXYL) 100 mcg, oral, Daily before breakfast    metFORMIN XR (GLUCOPHAGE-XR) 500 mg, oral, Daily with evening meal, Do not crush, chew, or split.    metoprolol tartrate " (LOPRESSOR) 25 mg, oral, 2 times daily    mometasone (Elocon) 0.1 % cream 1 Application    omeprazole (PRILOSEC) 20 mg, oral, Daily    predniSONE (DELTASONE) 40 mg, oral, Daily    sertraline (ZOLOFT) 100 mg, oral, Daily        Surgical History  She has no past surgical history on file.     Social History  She reports that she has never smoked. She has never been exposed to tobacco smoke. She has never used smokeless tobacco. She reports that she does not drink alcohol and does not use drugs.    Family History  Family History   Problem Relation Name Age of Onset    Breast cancer Mother  35    Lung cancer Father Jabier     COPD Father Jabier     Emphysema Father Jabier     Heart failure Father Jabier     Prostate cancer Father Jabier         Allergies  Penicillins and Sulfa (sulfonamide antibiotics)    ROS  Review of Systems   Constitutional: Negative.    Respiratory: Negative.     Cardiovascular: Negative.    Gastrointestinal: Negative.    Genitourinary: Negative.    Musculoskeletal: Negative.         Last Recorded Vitals  /86   Pulse 70   Temp 36.1 °C (96.9 °F)   Wt 102 kg (224 lb)   SpO2 96%     Physical Exam  Vitals and nursing note reviewed.   Constitutional:       Appearance: Normal appearance.   Cardiovascular:      Rate and Rhythm: Normal rate and regular rhythm.      Pulses: Normal pulses.      Heart sounds: Normal heart sounds.   Pulmonary:      Effort: Pulmonary effort is normal.      Breath sounds: Normal breath sounds.   Musculoskeletal:         General: Tenderness (right hip and coccyx) and signs of injury present.   Neurological:      Mental Status: She is alert.         Relevant Results      Assessment/Plan   Shanti was seen today for follow-up.  Diagnoses and all orders for this visit:  Right hip pain (Primary)  -     XR lumbar spine 2-3 views; Future  -     XR sacrum coccyx 2+ views; Future  -     XR hip right with pelvis when performed 2 or 3 views; Future  -     predniSONE (Deltasone) 20 mg tablet; Take  2 tablets (40 mg) by mouth once daily for 5 days.  Acute coccygeal pain  -     XR lumbar spine 2-3 views; Future  -     XR sacrum coccyx 2+ views; Future  -     XR hip right with pelvis when performed 2 or 3 views; Future  -     predniSONE (Deltasone) 20 mg tablet; Take 2 tablets (40 mg) by mouth once daily for 5 days.          COUNSELING      Medication education:              Education:  The patient is counseled regarding potential side-effects of any and all new medications             Understanding:  Patient expressed understanding             Adherence:  No barriers to adherence identified        Veronika Mathias, APRN-CNP

## 2024-12-16 ENCOUNTER — TREATMENT (OUTPATIENT)
Dept: PHYSICAL THERAPY | Facility: CLINIC | Age: 49
End: 2024-12-16
Payer: COMMERCIAL

## 2024-12-16 DIAGNOSIS — R42 VERTIGO: ICD-10-CM

## 2024-12-16 DIAGNOSIS — H81.90 VESTIBULAR DISORDER: ICD-10-CM

## 2024-12-16 PROCEDURE — 97140 MANUAL THERAPY 1/> REGIONS: CPT | Mod: GP

## 2024-12-16 ASSESSMENT — PAIN SCALES - GENERAL: PAINLEVEL_OUTOF10: 6

## 2024-12-16 ASSESSMENT — PAIN - FUNCTIONAL ASSESSMENT: PAIN_FUNCTIONAL_ASSESSMENT: 0-10

## 2024-12-16 NOTE — PROGRESS NOTES
Physical Therapy Treatment    Patient Name: Shanti Gonsales  MRN: [id number]  Encounter date: 12/16/2024    Time Calculation  Start Time: 1335  Stop Time: 1415  Time Calculation (min): 40 min  PT Therapeutic Procedures Time Entry  Manual Therapy Time Entry: 25  Therapeutic Exercise Time Entry: 6    Visit # 7 of 10  Visits/Dates Authorized: [facility] - NO AUTH / $2500 DEDUCT-MET / $6500 OOP not met / 80% coins / 20V     Current Problem:   Problem List Items Addressed This Visit             ICD-10-CM    Vertigo R42     Other Visit Diagnoses         Codes    Vestibular disorder     H81.90          Precautions:    HTN, thyroid disorder       Subjective   General:    Pt states she has had no episodes of dizziness since her last PT visit. Pt has had neck pain and headaches however. Saw her PCP due to her hip pain and was started on a steroid pack.     Pre-Treatment Symptoms:   Pain Assessment: 0-10  0-10 (Numeric) Pain Score: 6  Pain Location: Neck    Objective   Findings:   R upper cervical hypomobility  R suboccipital tenderness and hypertonicity     11/15/24 Positionals (tested without goggles)  Horizontal roll test L: negative for nystagmus and vertigo  Horizontal roll test R: negative for nystagmus and vertigo  Deep Head Hang: positive vertigo, 1-2 seconds, no nystagmus observed; returned upright via Yacovino maneuver  Loaded Ernesto-Hallpike L: negative for nystagmus and vertigo  Loaded Ernesto-Hallpike R: positive for upbeating/torsional nystagmus which fatigued in 1-2 sec, positive vertigo    7/22/24   CONCLUSIONS:  Right Carotid: The right proximal internal carotid artery is normal. Right external carotid artery appears patent with no evidence of stenosis. The right vertebral artery demonstrates a high resistance waveform which may be suggestive of a more distal stenosis or occlusion. No evidence of hemodynamically significant stenosis in the right subclavian artery.  Left Carotid: The left proximal internal carotid artery is  normal. Left external carotid artery appears patent with no evidence of stenosis. The left vertebral artery is patent with antegrade flow. No evidence of hemodynamically significant stenosis in the left subclavian artery.  Additional Findings:  Technically difficult exam due to body habitus and high carotid bifurcation.       6/19/24 VNG: IMPRESSIONS  Abnormal Videonystagmography VNG evaluation. There is no evidence of central vestibular system disorder. There is evidence of peripheral or central vestibular system disorder.  Bithermal caloric testing yielded symmetrical responses, and the directional preponderance is not significant. Oculomotor testing to visually guided signals was determined to be normal. 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      Treatments:     Therapeutic Exercise:  Cervical nods x 15  Cervical flexion DNF x 10    Manual Therapy:  MFR suboccipitals  MET for cervical SB L/R, suboccipitals L/R  Upglides grade 2 upper cervical on R  Trigger point release to suboccipitals on R  Cervical manual distraction grade III/IV      Modalities:   Moist heat for cervical spine    HEP / Access Codes:   Access Code: [email or web address] Date: 10/18/2024  - Seated Vertical Smooth Pursuit  - 2 x daily - 2 sets - 15 reps  - Seated Horizontal Smooth Pursuit  - 2 x daily - 2 sets - 15 reps  - Seated Gaze Stabilization with Head Rotation  - 2 x daily - 2 sets - 15 reps  - Seated Gaze Stabilization with Head Nod  - 2 x daily - 2 sets - 15 reps  - Seated Proximal-Distal Smooth Pursuit  - 2 x daily - 2 sets - 15 reps  - Vestibular Habituation - Seated Horizontal Head Rotation  - 2 x daily - 2 sets - 15 reps  - Seated Right Head Turns Vestibular Habituation  - 2 x daily - 2 sets - 5 reps  - Seated Left Head Turns Vestibular Habituation  - 2 x daily - 2 sets - 5  reps    Access Code: [email or web address]  URL: [email or web address]  Date: 12/04/2024  Prepared by: Maximilian Shirley    Exercises  - Supine Posterior Pelvic Tilt  - 1 x daily - 3 x weekly - 2 sets - 15 reps  - Supine Lower Trunk Rotation  - 1 x daily - 3 x weekly - 2 sets - 12 reps  - Supine Sciatic Nerve Glide  - 1 x daily - 3 x weekly - 2 sets - 12 reps  - Supine Piriformis Stretch with Foot on Ground  - 1 x daily - 3 x weekly - 1 sets - 3 reps - 30 hold      Assessment:    Pt reports no longer having dizziness since her last canal repositioning maneuver. Pt presented today with a headache and neck pain and symptoms reduced with PT intervention.     Post-Treatment Symptoms:    Response to Interventions: Decrease in pain     Plan:    Vestibular training. Modalities and manual therapy as needed for neck pain.    Goals:   Active       PT Problem       PT Goal 1       Start:  10/18/24    Expected End:  01/16/25         Pt will be able to drive for 30 minutes without exacerbation of symptoms.    Pt will be able to complete 30 minutes of grocery shopping without exacerbation of symptoms.    Pt will be able to read for 30 minutes without exacerbation of symptoms.    Pt will be able to complete single leg balance for 5 seconds on L/R LE to allow pt to don pants safely.    Pt will be able to stand with eyes closed for 30 seconds to allow pt to wash hair in shower safely.     Pt will improve Dizziness Handicap Index to > 80% function to allow pt to walk in crowded room without symptom exacerbation.

## 2024-12-18 ENCOUNTER — TELEPHONE (OUTPATIENT)
Dept: CARDIOLOGY | Facility: CLINIC | Age: 49
End: 2024-12-18
Payer: COMMERCIAL

## 2024-12-18 NOTE — TELEPHONE ENCOUNTER
Patient went to urgent care because she thought she was getting a cold. She is now worried about her numbers for blood pressure and heart rate bp 149/90 hr 56   She wants to know if those are concerning to you?  I made her an earlier appointment for January

## 2024-12-24 ENCOUNTER — TELEPHONE (OUTPATIENT)
Dept: CARDIOLOGY | Facility: CLINIC | Age: 49
End: 2024-12-24
Payer: COMMERCIAL

## 2024-12-24 NOTE — TELEPHONE ENCOUNTER
Left voicemail informing patient that the 1/3/25 appointment needs to be rescheduled. Also sent a MyChart message

## 2025-01-03 ENCOUNTER — APPOINTMENT (OUTPATIENT)
Dept: CARDIOLOGY | Facility: CLINIC | Age: 50
End: 2025-01-03
Payer: COMMERCIAL

## 2025-01-08 ENCOUNTER — APPOINTMENT (OUTPATIENT)
Dept: PHYSICAL THERAPY | Facility: CLINIC | Age: 50
End: 2025-01-08

## 2025-01-23 ENCOUNTER — TELEPHONE (OUTPATIENT)
Dept: PRIMARY CARE | Facility: CLINIC | Age: 50
End: 2025-01-23

## 2025-01-23 ENCOUNTER — OFFICE VISIT (OUTPATIENT)
Dept: PRIMARY CARE | Facility: CLINIC | Age: 50
End: 2025-01-23
Payer: COMMERCIAL

## 2025-01-23 ENCOUNTER — HOSPITAL ENCOUNTER (OUTPATIENT)
Dept: RADIOLOGY | Facility: HOSPITAL | Age: 50
Discharge: HOME | End: 2025-01-23
Payer: COMMERCIAL

## 2025-01-23 VITALS
HEART RATE: 87 BPM | DIASTOLIC BLOOD PRESSURE: 66 MMHG | TEMPERATURE: 98.3 F | RESPIRATION RATE: 18 BRPM | BODY MASS INDEX: 43.23 KG/M2 | HEIGHT: 61 IN | SYSTOLIC BLOOD PRESSURE: 88 MMHG | OXYGEN SATURATION: 97 % | WEIGHT: 229 LBS

## 2025-01-23 DIAGNOSIS — M53.3 ACUTE COCCYGEAL PAIN: ICD-10-CM

## 2025-01-23 DIAGNOSIS — M25.551 RIGHT HIP PAIN: ICD-10-CM

## 2025-01-23 PROCEDURE — 72220 X-RAY EXAM SACRUM TAILBONE: CPT

## 2025-01-23 PROCEDURE — 72110 X-RAY EXAM L-2 SPINE 4/>VWS: CPT

## 2025-01-23 PROCEDURE — 73502 X-RAY EXAM HIP UNI 2-3 VIEWS: CPT | Mod: RT

## 2025-01-23 ASSESSMENT — PAIN SCALES - GENERAL: PAINLEVEL_OUTOF10: 5

## 2025-01-23 ASSESSMENT — PATIENT HEALTH QUESTIONNAIRE - PHQ9
1. LITTLE INTEREST OR PLEASURE IN DOING THINGS: NOT AT ALL
SUM OF ALL RESPONSES TO PHQ9 QUESTIONS 1 AND 2: 0
2. FEELING DOWN, DEPRESSED OR HOPELESS: NOT AT ALL

## 2025-01-27 DIAGNOSIS — M53.3 ACUTE COCCYGEAL PAIN: ICD-10-CM

## 2025-01-27 DIAGNOSIS — M25.551 RIGHT HIP PAIN: Primary | ICD-10-CM

## 2025-01-30 ENCOUNTER — OFFICE VISIT (OUTPATIENT)
Dept: CARDIOLOGY | Facility: CLINIC | Age: 50
End: 2025-01-30
Payer: COMMERCIAL

## 2025-01-30 VITALS
HEART RATE: 82 BPM | RESPIRATION RATE: 16 BRPM | SYSTOLIC BLOOD PRESSURE: 126 MMHG | OXYGEN SATURATION: 98 % | DIASTOLIC BLOOD PRESSURE: 80 MMHG | BODY MASS INDEX: 43.08 KG/M2 | WEIGHT: 228 LBS

## 2025-01-30 DIAGNOSIS — I20.9 ANGINA PECTORIS: Primary | ICD-10-CM

## 2025-01-30 DIAGNOSIS — E78.2 MIXED HYPERLIPIDEMIA: ICD-10-CM

## 2025-01-30 DIAGNOSIS — I10 PRIMARY HYPERTENSION: ICD-10-CM

## 2025-01-30 DIAGNOSIS — I82.90 VENOUS THROMBOSIS: ICD-10-CM

## 2025-01-30 PROCEDURE — 3079F DIAST BP 80-89 MM HG: CPT | Performed by: INTERNAL MEDICINE

## 2025-01-30 PROCEDURE — 1036F TOBACCO NON-USER: CPT | Performed by: INTERNAL MEDICINE

## 2025-01-30 PROCEDURE — 99214 OFFICE O/P EST MOD 30 MIN: CPT | Performed by: INTERNAL MEDICINE

## 2025-01-30 PROCEDURE — 3074F SYST BP LT 130 MM HG: CPT | Performed by: INTERNAL MEDICINE

## 2025-01-30 RX ORDER — ATORVASTATIN CALCIUM 20 MG/1
20 TABLET, FILM COATED ORAL DAILY
Qty: 90 TABLET | Refills: 3 | Status: SHIPPED | OUTPATIENT
Start: 2025-01-30 | End: 2026-01-30

## 2025-01-30 RX ORDER — METOPROLOL SUCCINATE 25 MG/1
25 TABLET, EXTENDED RELEASE ORAL DAILY
Qty: 90 TABLET | Refills: 3 | Status: SHIPPED | OUTPATIENT
Start: 2025-01-30 | End: 2026-01-30

## 2025-01-30 ASSESSMENT — LIFESTYLE VARIABLES
HOW OFTEN DO YOU HAVE A DRINK CONTAINING ALCOHOL: NEVER
AUDIT TOTAL SCORE: 0
HAVE YOU OR SOMEONE ELSE BEEN INJURED AS A RESULT OF YOUR DRINKING: NO
AUDIT-C TOTAL SCORE: 0
HOW MANY STANDARD DRINKS CONTAINING ALCOHOL DO YOU HAVE ON A TYPICAL DAY: PATIENT DOES NOT DRINK
HAS A RELATIVE, FRIEND, DOCTOR, OR ANOTHER HEALTH PROFESSIONAL EXPRESSED CONCERN ABOUT YOUR DRINKING OR SUGGESTED YOU CUT DOWN: NO
HOW OFTEN DO YOU HAVE SIX OR MORE DRINKS ON ONE OCCASION: NEVER
SKIP TO QUESTIONS 9-10: 1

## 2025-01-30 ASSESSMENT — ENCOUNTER SYMPTOMS
DEPRESSION: 0
LOSS OF SENSATION IN FEET: 0
OCCASIONAL FEELINGS OF UNSTEADINESS: 0

## 2025-01-30 ASSESSMENT — PAIN SCALES - GENERAL: PAINLEVEL_OUTOF10: 0-NO PAIN

## 2025-01-30 NOTE — PROGRESS NOTES
History of present illness:  49 year old female patient here today following up on hx of atypical chest pains and abnormal EKG. her initial EKG was showing normal sinus rhythm nonspecific ST-T  abnormality suggestive of LVH.. 2D Echo on 04/19/19 was normal with EF of 60-65% and Nuclear stress test in 09/2019 was normal with EF of 53%.  Patient underwent CT calcium score on September 2021 that showed 0 calcium in her arteries. Lung windows were okay.  Patient had 2D echo in February 2024 as a workup for abnormal EKG and vertigo that showed normal ejection fraction no major valve abnormalities.  No clear evidence of LVH.  Patient returns to my office for follow-up.  Denies any chest pain shortness of breath palpitations or syncope.    Past Medical History:   Diagnosis Date    Angina pectoris 02/22/2024    Disorder of thyroid, unspecified     Thyroid trouble    Dizziness [id number]    Mixed hyperlipidemia 02/22/2024    Other conditions influencing health status     Right handed    Personal history of other diseases of the circulatory system     History of hypertension    Personal history of other specified conditions     History of shortness of breath    Primary hypertension 02/22/2024    Unspecified disorder of nose and nasal sinuses     Sinus trouble       History reviewed. No pertinent surgical history.    Allergies   Allergen Reactions    Penicillins Swelling and Unknown     Facial swelling    Sulfa (Sulfonamide Antibiotics) Unknown        reports that she has never smoked. She has never been exposed to tobacco smoke. She has never used smokeless tobacco. She reports that she does not drink alcohol and does not use drugs.    Family History   Problem Relation Name Age of Onset    Breast cancer Mother  35    Lung cancer Father Jabier     COPD Father Jabier     Emphysema Father Jabier     Heart failure Father Jabier     Prostate cancer Father Jabier        Patient's Medications   New Prescriptions    No medications on file   Previous  Medications    ALBUTEROL 90 MCG/ACTUATION INHALER    Inhale 2 puffs 4 times a day.    ASPIRIN 81 MG EC TABLET    Take 1 tablet (81 mg) by mouth once daily.    AZELASTINE (ASTELIN) 137 MCG (0.1 %) NASAL SPRAY    Administer into affected nostril(s) every 12 hours.    BENZONATATE (TESSALON) 200 MG CAPSULE    Take 1 capsule (200 mg) by mouth 3 times a day as needed for cough. Do not crush or chew.    FENOFIBRATE (TRICOR) 145 MG TABLET    Take 1 tablet (145 mg) by mouth once daily.    GUAIFENESIN (MUCINEX) 600 MG 12 HR TABLET    Take 2 tablets (1,200 mg) by mouth 2 times a day as needed for cough or congestion. Do not crush, chew, or split.    LEVOTHYROXINE (SYNTHROID, LEVOXYL) 100 MCG TABLET    TAKE 1 TABLET BY MOUTH EVERY DAY IN THE MORNING ON EMPTY STOMACH FOR 90 DAYS    METFORMIN  MG 24 HR TABLET    Take 1 tablet (500 mg) by mouth once daily in the evening. Take with meals for 8 days. Do not crush, chew, or split.    METOPROLOL TARTRATE (LOPRESSOR) 25 MG TABLET    Take 1 tablet (25 mg) by mouth 2 times a day.    MOMETASONE (ELOCON) 0.1 % CREAM    1 Application.    OMEPRAZOLE (PRILOSEC) 20 MG DR CAPSULE    TAKE 1 CAPSULE BY MOUTH EVERY DAY    SERTRALINE (ZOLOFT) 100 MG TABLET    TAKE 1 TABLET BY MOUTH EVERY DAY FOR 90 DAYS   Modified Medications    No medications on file   Discontinued Medications    No medications on file       Objective   Physical Exam  General: Patient in no acute distress   HEENT: Atraumatic normocephalic.  Neck: Supple, jugular venous pressure within normal limit.  No bruits  Lungs: Clear to auscultation bilaterally  Cardiovascular: Regular rate and rhythm, normal heart sounds, no murmurs rubs or gallops  Abdomen: Soft nontender nondistended.  Normal bowel sounds.  Extremities: Warm to touch, no edema.    Lab Review   No visits with results within 2 Month(s) from this visit.   Latest known visit with results is:   Lab on 06/25/2024   Component Date Value    Hemoglobin A1C 06/25/2024 5.7 (H)      Estimated Average Glucose 06/25/2024 117     Glucose 06/25/2024 93     Sodium 06/25/2024 140     Potassium 06/25/2024 4.5     Chloride 06/25/2024 101     Bicarbonate 06/25/2024 27     Urea Nitrogen 06/25/2024 21     Creatinine 06/25/2024 1.10     eGFR 06/25/2024 61     Calcium 06/25/2024 9.8     Anion Gap 06/25/2024 12         Assessment/Plan   Patient Active Problem List   Diagnosis    Angina pectoris    Anxiety    Carpal tunnel syndrome of left wrist    Cough, unspecified    Cystitis    Dizziness and giddiness    Eczema    Gastroesophageal reflux disease    Hand pain    Hypothyroidism    Kidney stone    Lightheadedness    Mixed hyperlipidemia    Obstructive sleep apnea syndrome    Pain, unspecified    Primary hypertension    Seasonal allergic rhinitis    Shortness of breath    Venous thrombosis    Prediabetes    Vertigo      49 year old female patient here today following up on hx of atypical chest pains and abnormal EKG. her initial EKG was showing normal sinus rhythm nonspecific ST-T  abnormality suggestive of LVH.. 2D Echo on 04/19/19 was normal with EF of 60-65% and Nuclear stress test in 09/2019 was normal with EF of 53%.  Patient underwent CT calcium score on September 2021 that showed 0 calcium in her arteries. Lung windows were okay.  Patient had 2D echo in February 2024 as a workup for abnormal EKG and vertigo that showed normal ejection fraction no major valve abnormalities.  No clear evidence of LVH.  Patient returns to my office for follow-up.  Denies any chest pain shortness of breath palpitations or syncope.    Patient blood pressure and heart rate well-controlled.  Last LDL above 100 triglycerides better controlled on fenofibrate.  With her risk factors including obesity diabetes and hypertension I will start her on atorvastatin 20 mg oral daily.  Will continue fenofibrate.  Continue other medications.  Otherwise stable from my standpoint.  Discussed with her lifestyle modification and weight  loss.  Will follow-up in 6 months    Sebastian Cintron MD

## 2025-01-30 NOTE — TELEPHONE ENCOUNTER
Veronika ALAMO [facility], APRN-CNP  1/29/2025  3:44 PM EST    Slight offset of the end of her tailbone could be posttraumatic possibly after a fall or could just be normal for her shows some arthritis of her L1 and 2 and T12-L1 so mid to lower back and some arthritis of her right hip would advise physical therapy I can put an order in if she would like to do that or we can give her a referral to Ortho would give her Dr. Carroll

## 2025-01-31 ENCOUNTER — TELEPHONE (OUTPATIENT)
Dept: PRIMARY CARE | Facility: CLINIC | Age: 50
End: 2025-01-31
Payer: COMMERCIAL

## 2025-01-31 NOTE — TELEPHONE ENCOUNTER
Veronika ALAMO [facility], APRN-CNP  1/29/2025  3:44 PM EST       Slight offset of the end of her tailbone could be posttraumatic possibly after a fall or could just be normal for her shows some arthritis of her L1 and 2 and T12-L1 so mid to lower back and some arthritis of her right hip would advise physical therapy I can put an order in if she would like to do that or we can give her a referral to Ortho would give her Dr. Carroll       Patient advised via voicemail of above imaging results. Awaiting patient to call back and let office know if she would like PT, Ortho, or both referrals

## 2025-02-03 ENCOUNTER — TELEPHONE (OUTPATIENT)
Dept: CARDIOLOGY | Facility: CLINIC | Age: 50
End: 2025-02-03
Payer: COMMERCIAL

## 2025-02-03 NOTE — TELEPHONE ENCOUNTER
You saw this patient last week and she said she had previously been on Metoprolol tartrate 25 mg bid and now you sent in 25 mg of the XL daily. I did not see that mentioned in your note. Is that what you want her on now.

## 2025-02-17 DIAGNOSIS — R73.03 PREDIABETES: ICD-10-CM

## 2025-02-18 RX ORDER — METFORMIN HYDROCHLORIDE 500 MG/1
TABLET, EXTENDED RELEASE ORAL
Qty: 90 TABLET | Refills: 0 | Status: SHIPPED | OUTPATIENT
Start: 2025-02-18 | End: 2026-02-18

## 2025-02-18 NOTE — TELEPHONE ENCOUNTER
Prescription request received and populated   Pharmacy populated  Last Office Visit: 12/13/24  Has upcoming appmt 3/06/25

## 2025-02-19 ENCOUNTER — APPOINTMENT (OUTPATIENT)
Dept: ORTHOPEDIC SURGERY | Facility: CLINIC | Age: 50
End: 2025-02-19
Payer: COMMERCIAL

## 2025-02-24 ENCOUNTER — APPOINTMENT (OUTPATIENT)
Dept: ORTHOPEDIC SURGERY | Facility: CLINIC | Age: 50
End: 2025-02-24
Payer: COMMERCIAL

## 2025-02-24 VITALS — BODY MASS INDEX: 43.05 KG/M2 | HEIGHT: 61 IN | WEIGHT: 228 LBS

## 2025-02-24 DIAGNOSIS — G89.29 COCCYGEAL PAIN, CHRONIC: ICD-10-CM

## 2025-02-24 DIAGNOSIS — M53.3 COCCYGEAL PAIN, CHRONIC: ICD-10-CM

## 2025-02-24 DIAGNOSIS — M70.61 TROCHANTERIC BURSITIS OF RIGHT HIP: Primary | ICD-10-CM

## 2025-02-24 DIAGNOSIS — G89.29 CHRONIC BILATERAL LOW BACK PAIN WITHOUT SCIATICA: ICD-10-CM

## 2025-02-24 DIAGNOSIS — M54.50 CHRONIC BILATERAL LOW BACK PAIN WITHOUT SCIATICA: ICD-10-CM

## 2025-02-24 PROCEDURE — 1036F TOBACCO NON-USER: CPT | Performed by: STUDENT IN AN ORGANIZED HEALTH CARE EDUCATION/TRAINING PROGRAM

## 2025-02-24 PROCEDURE — 99214 OFFICE O/P EST MOD 30 MIN: CPT | Performed by: STUDENT IN AN ORGANIZED HEALTH CARE EDUCATION/TRAINING PROGRAM

## 2025-02-24 PROCEDURE — 3008F BODY MASS INDEX DOCD: CPT | Performed by: STUDENT IN AN ORGANIZED HEALTH CARE EDUCATION/TRAINING PROGRAM

## 2025-02-24 PROCEDURE — 99204 OFFICE O/P NEW MOD 45 MIN: CPT | Performed by: STUDENT IN AN ORGANIZED HEALTH CARE EDUCATION/TRAINING PROGRAM

## 2025-02-24 RX ORDER — MELOXICAM 15 MG/1
15 TABLET ORAL DAILY
Qty: 30 TABLET | Refills: 0 | Status: SHIPPED | OUTPATIENT
Start: 2025-02-24 | End: 2025-03-26

## 2025-02-24 ASSESSMENT — ENCOUNTER SYMPTOMS
DEPRESSION: 0
LOSS OF SENSATION IN FEET: 0
OCCASIONAL FEELINGS OF UNSTEADINESS: 0

## 2025-02-24 ASSESSMENT — PAIN - FUNCTIONAL ASSESSMENT: PAIN_FUNCTIONAL_ASSESSMENT: 0-10

## 2025-02-24 NOTE — PROGRESS NOTES
Orthopaedic Spine Surgery Clinic Note    Patient ID: Shanti Gonsales is a 49 y.o. female.    Referral from Veronika Mathias NP    Chief Complaint  Chief Complaint   Patient presents with    Spine - Pain     States she has fallen a couple time on her tailbone and she has pain that makes her shift and could be causing her right hip pain     Right Hip - Pain     Not sure if this is pain from her tailbone  she has lateral rt hip pain  she states it feels better        History of Present Illness  Ms. Gonsales is a pleasant 49-year-old female who presents for initial evaluation of chronic low back pain and right sided lateral hip pain.  Patient states that she has had longstanding issues with pain in her lower back.  She describes pain in a bandlike distribution over her lower back that worsens with prolonged standing and ambulation.  She does obtain reliable rest relief with sitting and stretching it out.  Since November 2024 she separately describes pain over the lateral aspect of her hip that hurts to touch.  She also relates worsening pain in this region when she lays directly on her right side.  She also separately relates a series of falls over the last several months that all seem to have mechanical component.  She states she tripped down the stairs 1 time and then the second time she slipped on ice.  She denies any syncopal event.  She does have chronic vertigo however denies any myelopathic symptoms including upper extremity numbness or paresthesias or dexterity issues.  She does describe some left arm numbness and paresthesias that are very intermittent when she rests her elbow on a hard surface.  She does have a history of bilateral carpal tunnel releases.  However, as a result of the falls, she did sustain pain over her inferior buttock/tailbone.  She had this evaluated with her PCP office with a series of XRs that suggested a coccygeal fracture, age-indeterminate.    She endorses some very intermittent numbness  and paresthesias down her left lower extremity.  Denies any bowel or bladder control issues.  She was referred to PT by her PCP office which she has scheduled at the end of February for her low back and her hip.  She also very intermittently takes as needed ibuprofen.  She does tolerate NSAIDs well.        Relevant PMH/PSH for spine  BMI 43.08    Past Medical History:   Diagnosis Date    Angina pectoris 02/22/2024    Disorder of thyroid, unspecified     Thyroid trouble    Dizziness [id number]    Mixed hyperlipidemia 02/22/2024    Other conditions influencing health status     Right handed    Personal history of other diseases of the circulatory system     History of hypertension    Personal history of other specified conditions     History of shortness of breath    Primary hypertension 02/22/2024    Unspecified disorder of nose and nasal sinuses     Sinus trouble       No past surgical history on file.    Social History     Socioeconomic History    Marital status:    Tobacco Use    Smoking status: Never     Passive exposure: Never    Smokeless tobacco: Never   Vaping Use    Vaping status: Never Used   Substance and Sexual Activity    Alcohol use: Never    Drug use: Never    Sexual activity: Not Currently     Social Drivers of Health     Financial Resource Strain: Low Risk  (4/10/2023)    Received from [facility]    Overall Financial Resource Strain (CARDIA)     Difficulty of Paying Living Expenses: Not hard at all   Food Insecurity: No Food Insecurity (4/10/2023)    Received from [facility]    Hunger Vital Sign     Worried About Running Out of Food in the Last Year: Never true     Ran Out of Food in the Last Year: Never true   Transportation Needs: No Transportation Needs (4/10/2023)    Received from [facility]    PRAPARE - Transportation     Lack of Transportation (Medical): No     Lack of Transportation (Non-Medical): No   Physical Activity: Inactive (4/10/2023)     Received from [facility]    Exercise Vital Sign     Days of Exercise per Week: 0 days     Minutes of Exercise per Session: 0 min   Stress: Stress Concern Present (4/10/2023)    Received from [facility]    [employer] [place] [employer] - Occupational Stress Questionnaire     Feeling of Stress : To some extent   Social Connections: Socially Isolated (4/10/2023)    Received from [facility]    Social Connection and Isolation Panel [NHANES]     Frequency of Communication with Friends and Family: Once a week     Frequency of Social Gatherings with Friends and Family: Once a week     Attends Gnosticism Services: 1 to 4 times per year     Active Member of Clubs or Organizations: No     Attends Club or Organization Meetings: Never     Marital Status:    Intimate Partner Violence: Not At Risk (4/10/2023)    Received from [facility]    Humiliation, Afraid, Rape, and Kick questionnaire     Fear of Current or Ex-Partner: No     Emotionally Abused: No     Physically Abused: No     Sexually Abused: No   Housing Stability: Low Risk  (4/10/2023)    Received from [facility]    Housing Stability Vital Sign     Unable to Pay for Housing in the Last Year: No     Number of Places Lived in the Last Year: 1     Unstable Housing in the Last Year: No       Family History   Problem Relation Name Age of Onset    Breast cancer Mother  35    Lung cancer Father Jabier     COPD Father Jabier     Emphysema Father Jabier     Heart failure Father Jabier     Prostate cancer Father Jabier        Allergies   Allergen Reactions    Penicillins Swelling and Unknown     Facial swelling    Sulfa (Sulfonamide Antibiotics) Unknown       Current Outpatient Medications   Medication Instructions    albuterol 90 mcg/actuation inhaler 2 puffs, inhalation, 4 times daily RT    aspirin 81 mg, Daily    atorvastatin (LIPITOR) 20 mg, oral, Daily    azelastine (Astelin) 137 mcg (0.1 %) nasal spray  Every 12 hours    benzonatate (TESSALON) 200 mg, oral, 3 times daily PRN, Do not crush or chew.    fenofibrate (TRICOR) 145 mg, oral, Daily    guaiFENesin (MUCINEX) 1,200 mg, oral, 2 times daily PRN, Do not crush, chew, or split.    levothyroxine (SYNTHROID, LEVOXYL) 100 mcg, oral, Daily before breakfast    meloxicam (MOBIC) 15 mg, oral, Daily    metFORMIN  mg 24 hr tablet TAKE 1 TABLET (500 MG) BY MOUTH ONCE DAILY IN THE EVENING. TAKE WITH MEALS FOR 8 DAYS. DO NOT CRUSH, CHEW, OR SPLIT.    metoprolol succinate XL (TOPROL-XL) 25 mg, oral, Daily, Do not crush or chew.    metoprolol tartrate (LOPRESSOR) 25 mg, oral, 2 times daily    mometasone (Elocon) 0.1 % cream 1 Application    omeprazole (PRILOSEC) 20 mg, oral, Daily    sertraline (ZOLOFT) 100 mg, oral, Daily         Vitals & Measurements  There were no vitals filed for this visit.     Ortho Exam  General: AO x 3, NAD  Cardio: examined extremities perfused by peripheral palpation  Resp: breathing unlabored  Gait: within normal limits, non-antalgic  Tenderness: +TTP over right greater trochanter. Minimal tenderness over sacrum/coccyx region.     Lower Extremity  Right  Left  IP   5/5  5/5  Quadriceps  5/5  5/5  Tibialis anterior  5/5  5/5  EHL   5/5  5/5  Gastrocnemius  5/5  5/5    Sensation: Normal to light touch throughout lower extremities bilaterally.    Reflexes:  Right   Left  Q  4+  3+  A   2+   2+    Clonus: negative      Diagnostic Results - Imaging    XR lumbar spine, 1/23/25  FINDINGS:  Moderate-severe disc space narrowing endplate spurring is present at  L1-2 as well as moderate disc space narrowing endplate spurring of  T12-L1. Mild multilevel disc space narrowing endplate spurring  present at the remaining levels of the lumbar spine. Lower lumbar  facet arthrosis is present. No acute fracture, subluxation, or  compression deformity is seen. No spondylolysis or spondylolisthesis  is seen. Pelvic tubal ligation clips are noted.       IMPRESSION:  Multilevel degenerative disc changes most prominent at L1-2 and  T12-L1.      Lower lumbar facet arthrosis.        XR hip right, 1/23/25  FINDINGS:  Alignment is intact. There is mild joint space narrowing and marginal  spurring of the right hip. No acute fracture or subluxation is seen.  Soft tissues are unremarkable.      IMPRESSION:  Mild joint space narrowing and marginal spurring of the right hip.        XR sacrum/coccyx, 1/23/25  FINDINGS:  Visualization of the mid and lower sacrum is obscured on frontal  projections by overlying bowel gas and fecal residue. On lateral  projection there is slight posterior offset of the most distal  coccygeal segment which could be posttraumatic. No appreciable acute  fracture. Lower lumbar facet arthrosis and mild disc space narrowing  is noted. Left pelvic tubal ligation clips are similar to prior along  with the left pelvic phleboliths. No diastasis of the symphysis pubis  or SI joints. There is mild marginal spurring and sclerosis at the  pubic symphysis.      IMPRESSION:  Slight posterior offset of the most distal coccygeal segment on  lateral projection could be posttraumatic.        Diagnosis  Encounter Diagnoses   Name Primary?    Coccygeal pain, chronic     Trochanteric bursitis of right hip Yes    Chronic bilateral low back pain without sciatica           Assessment/Plan  Ms. Gonsales is a pleasant 49-year-old female who presents for initial evaluation of chronic low back pain with right lateral hip pain.  On history and examination, her symptoms are consistent with right hip trochanteric bursitis.  Her XRs of the lumbar spine do demonstrate multilevel spondylosis and facet arthropathy.  Her XRs of the sacrum do demonstrate possibly age-indeterminate distal coccygeal fracture/trauma, although this is difficult to appreciate.  On examination, it seems that her most bothersome symptom at the moment is her chronic low back pain as well as her right lateral  hip trochanteric bursitis.  Her coccyx/sacrum is not overtly tender and she agrees that her symptoms at this region have improved since initially sustained.    We had an extensive discussion in the office today with regards to her symptoms, her imaging findings, and possible management options.  We discussed her primary diagnosis of right trochanteric bursitis which seems to be her most bothersome complaint.  We discussed proceeding with physical therapy as scheduled for a low back and lower extremity stretching and strengthening program.  This should also help with her chronic low back pain, likely secondary to her lumbar spondylosis in addition to a muscular component.  I did provide a referral to aquatic therapy as this can be helpful for her if she is unable to tolerate land-based therapy.  I also prescribed meloxicam 15 mg/day to be taken on an as-needed basis for her pains.  We discussed not taking this with other NSAIDs.  We briefly discussed side effects and intolerances, including gastritis/ulcers and kidney function.  I also placed a referral to my sports medicine colleague, Dr. Parekh, for consideration of a right trochanteric bursal injection.    We spent several minutes discussing efforts at weight loss.  We discussed dietary changes in addition to exercise/aquatic therapy which could be helpful for her various pains.  She was in agreement with this.  We also discussed circling back with her PCP office about I discussion regarding GLP-1 medications and the role this may play in her weight loss journey.  Patient will follow-up with my office on an as-needed basis if her symptoms fail to improve or worsen at which point we can consider an MRI of the lumbar spine to evaluate for any focus of nerve stenosis that may be contributing to her symptoms. After our discussion, the patient articulated understanding of the plan and felt that all questions had been answered satisfactorily. The patient was pleased  with the visit and very appreciative for the care rendered.    **Please excuse any errors in grammar or translation related to this dictation. Voice recognition software was utilized to prepare this document. **    F/u PRN.       Orders Placed This Encounter    Referral to Physical Therapy    Referral to Sports Medicine    meloxicam (Mobic) 15 mg tablet       --    Dmitry Mcclellan MD  Orthopaedic Spine Surgery  , Department of Orthopaedic Surgery  [facility]

## 2025-02-28 ENCOUNTER — EVALUATION (OUTPATIENT)
Dept: PHYSICAL THERAPY | Facility: CLINIC | Age: 50
End: 2025-02-28
Payer: COMMERCIAL

## 2025-02-28 DIAGNOSIS — M53.3 ACUTE COCCYGEAL PAIN: ICD-10-CM

## 2025-02-28 DIAGNOSIS — M25.551 RIGHT HIP PAIN: ICD-10-CM

## 2025-02-28 PROCEDURE — 97161 PT EVAL LOW COMPLEX 20 MIN: CPT | Mod: GP

## 2025-02-28 ASSESSMENT — PAIN - FUNCTIONAL ASSESSMENT: PAIN_FUNCTIONAL_ASSESSMENT: 0-10

## 2025-02-28 ASSESSMENT — PAIN DESCRIPTION - DESCRIPTORS: DESCRIPTORS: SHARP

## 2025-02-28 ASSESSMENT — PAIN SCALES - GENERAL: PAINLEVEL_OUTOF10: 9

## 2025-03-03 ENCOUNTER — APPOINTMENT (OUTPATIENT)
Dept: ORTHOPEDIC SURGERY | Facility: CLINIC | Age: 50
End: 2025-03-03
Payer: COMMERCIAL

## 2025-03-03 NOTE — PROGRESS NOTES
Physical Therapy Evaluation    Patient Name: Shanti Gonsales  MRN: [id number]  Evaluation Date: 2/28/2025  Time Calculation  Start Time: 1345  Stop Time: 1415  Time Calculation (min): 30 min  PT Evaluation Time Entry  PT Evaluation (Low) Time Entry: 30             Problem List Items Addressed This Visit             ICD-10-CM    Right hip pain M25.551    Relevant Orders    Follow Up In Physical Therapy    Acute coccygeal pain M53.3    Relevant Orders    Follow Up In Physical Therapy         Subjective   General:  General  Reason for Referral: R hip pain, acute coccygeal pain, LBP  Referred By: Veronika Mathias, APRN-CNP  Past Medical History Relevant to Rehab: 50 y/o F who presents today for evaluation with cc LBP, RLE proximal hip pain.  Denies falls or traums.  Reports increased intensity with standing and static sitting. H/O PT in past with limited success  General Comment: Ind ambulation in to clinic    Patient reported hx of condition: H/O chronic LBP, intermittent coccyx pain    Precautions:  Precautions  Precautions Comment: No precautions    Relevant PMH:  HTN, OA, thyroid disorder    Red flags: None    Pain:  Pain Assessment: 0-10  0-10 (Numeric) Pain Score: 9  Pain Location: Back (+ R hip)  Pain Orientation: Right  Pain Radiating Towards: RLE anterior/lateral hip  Pain Descriptors: Sharp  Home Living:  Home Living Comment: Independent    Prior Function Per Pt/Caregiver Report:  Ambulatory Assistance: Independent    Imaging:  XR LUMBAR 12/2024:  Multilevel degenerative disc changes most prominent at L1-2 and  T12-L1. Lower lumbar facet arthrosis.    XR SACRUM:  Slight posterior offset of the most distal coccygeal segment on  lateral projection could be posttraumatic.        OBJECTIVE:  Objective   Posture:  Posture Comment: Reduced lordosis, lumbar  Range of Motion:  Range of Motion Comments: Limited trunk extension due to pain.  Rotation and flexion WFL  Strength:  Strength Comments: 3+/5 lower rectus and  hip flexor strength.  4+/5 distally.  Difficulty with HL bridge and pelvic tilt.  Flexibility:  Flexibility Comment: Tight gluteals/piriformi  Palpation:  Palpation Comment: Tender R lateral hip, R lumbar, R SI  Special Tests:  Special Tests Comment: + SLR R in supine  Gait:  Gait Comment: WNL  Balance:  Balance Comment: WNL  Stairs:  Stairs Comment: N/A  Bed Mobility:  Bed Mobility Comment: Independent  Transfers:  Transfers Comment: Independent  Other:  Comment: Standing endurance 15-30 minutes before pain exacerbation.  Denies major LE paraesthesias    Outcome Measures:  51% impairment WOMAC    Assessment  PT Assessment Results: Decreased strength, Decreased range of motion, Decreased endurance, Decreased mobility, Pain  Rehab Prognosis: Fair  Barriers to Discharge: None  Evaluation/Treatment Tolerance: Patient limited by pain    Pt is a 49 y.o. female who presents with impairments of LBP, RLE radicular pain, impaired trunk AROM/strength, impaired posture, impaired LE strength. These impairments have led to functional limitations including impaired overall mobility and activity tolerance. Pt would benefit from skilled physical therapy intervention to improve above impairments and facilitate return to function.    Complexity of Evaluation: Low    Based on the history including personal factors and/or comorbidities, examination of body systems including body structures and function, activity limitations, and/or participation restrictions, as well as clinical presentation, patient meets criteria for above complexity evaluation.    Plan  Treatment/Interventions: Aquatic therapy, Dry needling, Cryotherapy, Education/ Instruction, Electrical stimulation, Gait training, Hot pack, Manual therapy, Mechanical traction, Neuromuscular re-education, Self care/ home management, Taping techniques, Therapeutic activities, Therapeutic exercises, Ultrasound  PT Plan: Skilled PT  PT Frequency: 2 times per week  Duration: 10-20  weeks  Onset Date: 02/28/25  Number of Treatments Authorized: MN, re-evaluate visit 10  Rehab Potential: Good  Plan of Care Agreement: Patient    Insurance Plan: Payor: [employer] / Plan: [employer] NATIONAL ADVANTAGE PROGRAM / Product Type: *No Product type* /     Plan for next visit: start aquatics, focus on deep end decompression    OP EDUCATION:  Outpatient Education  Individual(s) Educated: Patient  Education Provided: POC  Risk and Benefits Discussed with Patient/Caregiver/Other: yes  Patient/Caregiver Demonstrated Understanding: yes  Plan of Care Discussed and Agreed Upon: yes  Patient Response to Education: Patient/Caregiver Verbalized Understanding of Information    Today's Treatment:  Evaluation and discussion only  HEP to be completed daily, exercises include:  ISO ABD T.I.D.; to be further developed    Goals:  STG 1: patient will report 10% reduction in lower back and RLE pain  STG 2: patient will be IND with core strengthening HEP x 5-6 visits  LTG 1: patient will report < 30% impairment WOMAC/Oswestry  LTG 2: patient will demonstrate 4+/5 B/L LE strength  LTG 3: patient will tolerate 1 hour standing with no exacerbation of LBP/RLE pain for improved endurance  LTG 4: patient will report good pain control with symptoms < 5/10 x 2 weeks  LTG 5: patient will be IND with aquatics/standing core strengthening HEP x 20 visits.

## 2025-03-06 ENCOUNTER — APPOINTMENT (OUTPATIENT)
Dept: PRIMARY CARE | Facility: CLINIC | Age: 50
End: 2025-03-06
Payer: COMMERCIAL

## 2025-03-06 VITALS
HEIGHT: 61 IN | HEART RATE: 79 BPM | WEIGHT: 228.2 LBS | TEMPERATURE: 98.3 F | SYSTOLIC BLOOD PRESSURE: 126 MMHG | OXYGEN SATURATION: 93 % | DIASTOLIC BLOOD PRESSURE: 78 MMHG | BODY MASS INDEX: 43.08 KG/M2

## 2025-03-06 DIAGNOSIS — R73.03 PREDIABETES: Primary | ICD-10-CM

## 2025-03-06 DIAGNOSIS — F41.9 ANXIETY: ICD-10-CM

## 2025-03-06 DIAGNOSIS — E03.9 HYPOTHYROIDISM, UNSPECIFIED TYPE: ICD-10-CM

## 2025-03-06 PROBLEM — R05.9 COUGH, UNSPECIFIED: Status: RESOLVED | Noted: 2024-02-22 | Resolved: 2025-03-06

## 2025-03-06 PROBLEM — R06.02 SHORTNESS OF BREATH: Status: RESOLVED | Noted: 2024-02-22 | Resolved: 2025-03-06

## 2025-03-06 LAB — POC HEMOGLOBIN A1C: 6.5 % (ref 4.2–6.5)

## 2025-03-06 PROCEDURE — 3008F BODY MASS INDEX DOCD: CPT | Performed by: FAMILY MEDICINE

## 2025-03-06 PROCEDURE — 99214 OFFICE O/P EST MOD 30 MIN: CPT | Performed by: FAMILY MEDICINE

## 2025-03-06 PROCEDURE — 3074F SYST BP LT 130 MM HG: CPT | Performed by: FAMILY MEDICINE

## 2025-03-06 PROCEDURE — 3078F DIAST BP <80 MM HG: CPT | Performed by: FAMILY MEDICINE

## 2025-03-06 PROCEDURE — 83036 HEMOGLOBIN GLYCOSYLATED A1C: CPT | Performed by: FAMILY MEDICINE

## 2025-03-06 ASSESSMENT — PAIN SCALES - GENERAL: PAINLEVEL_OUTOF10: 0-NO PAIN

## 2025-03-06 ASSESSMENT — LIFESTYLE VARIABLES: HOW MANY STANDARD DRINKS CONTAINING ALCOHOL DO YOU HAVE ON A TYPICAL DAY: PATIENT DOES NOT DRINK

## 2025-03-06 NOTE — PROGRESS NOTES
History Of Present Illness  Shanti Gonsales is a 49 y.o. female presenting for Follow-up (Diabetic follow up./Today's A1c: 6.5%.)  .    HPI   Prediabetic. Trying to change diet but struggling due to recent stressors in family. Switched to sugar free pop. A1c today 6.5%, highest it has been.    Dad  a few months ago. Brother (age 30s)  a month ago. Coping okay, normal grief.     Past Medical History  Patient Active Problem List    Diagnosis Date Noted    Right hip pain 2025    Acute coccygeal pain 2025    Vertigo 2024    Angina pectoris 2024    Anxiety 2024    Carpal tunnel syndrome of left wrist 2024    Cystitis 2024    Dizziness and giddiness 2024    Eczema 2024    Gastroesophageal reflux disease 2024    Hand pain 2024    Kidney stone 2024    Lightheadedness 2024    Mixed hyperlipidemia 2024    Obstructive sleep apnea syndrome 2024    Primary hypertension 2024    Venous thrombosis 2024    Prediabetes 2024    Hypothyroidism 2024    Pain, unspecified 2023    Seasonal allergic rhinitis 2019        Medications  Current Outpatient Medications   Medication Sig Dispense Refill    albuterol 90 mcg/actuation inhaler Inhale 2 puffs 4 times a day. 18 g 0    aspirin 81 mg EC tablet Take 1 tablet (81 mg) by mouth once daily.      atorvastatin (Lipitor) 20 mg tablet Take 1 tablet (20 mg) by mouth once daily. 90 tablet 3    azelastine (Astelin) 137 mcg (0.1 %) nasal spray Administer into affected nostril(s) every 12 hours.      fenofibrate (Tricor) 145 mg tablet Take 1 tablet (145 mg) by mouth once daily. 30 tablet 11    guaiFENesin (Mucinex) 600 mg 12 hr tablet Take 2 tablets (1,200 mg) by mouth 2 times a day as needed for cough or congestion. Do not crush, chew, or split. 30 tablet 0    levothyroxine (Synthroid, Levoxyl) 100 mcg tablet TAKE 1 TABLET BY MOUTH EVERY DAY IN THE MORNING ON EMPTY  STOMACH FOR 90 DAYS 90 tablet 1    meloxicam (Mobic) 15 mg tablet Take 1 tablet (15 mg) by mouth once daily. 30 tablet 0    metFORMIN  mg 24 hr tablet TAKE 1 TABLET (500 MG) BY MOUTH ONCE DAILY IN THE EVENING. TAKE WITH MEALS FOR 8 DAYS. DO NOT CRUSH, CHEW, OR SPLIT. 90 tablet 0    metoprolol succinate XL (Toprol-XL) 25 mg 24 hr tablet Take 1 tablet (25 mg) by mouth once daily. Do not crush or chew. 90 tablet 3    mometasone (Elocon) 0.1 % cream 1 Application.      omeprazole (PriLOSEC) 20 mg DR capsule TAKE 1 CAPSULE BY MOUTH EVERY DAY 90 capsule 1    semaglutide (Rybelsus) 3 mg tablet Take 1 tablet (3 mg) by mouth once daily. 30 tablet 5    sertraline (Zoloft) 100 mg tablet TAKE 1 TABLET BY MOUTH EVERY DAY FOR 90 DAYS 90 tablet 3     No current facility-administered medications for this visit.        Surgical History  She has no past surgical history on file.     Social History  She reports that she has never smoked. She has never been exposed to tobacco smoke. She has never used smokeless tobacco. She reports that she does not drink alcohol and does not use drugs.    Family History  Family History   Problem Relation Name Age of Onset    Breast cancer Mother  35    Lung cancer Father Jabier     COPD Father Jabier     Emphysema Father Jabier     Heart failure Father Jabier     Prostate cancer Father Jabier     Diabetes Sister Carmelita     Drug abuse Brother          Allergies  Penicillins and Sulfa (sulfonamide antibiotics)    Immunizations  Immunization History   Administered Date(s) Administered    Flu vaccine (IIV4), preservative free *Check age/dose* 10/13/2020, 10/19/2021    Flu vaccine, quadrivalent, no egg protein, age 6 month or greater (FLUCELVAX) 10/16/2020    Flu vaccine, trivalent, preservative free, age 6 months and greater (Fluarix/Fluzone/Flulaval) 12/09/2015    Influenza, injectable, quadrivalent 10/10/2019, 10/04/2022    Influenza, seasonal, injectable 12/09/2015    Moderna SARS-CoV-2 Vaccination 01/08/2021,  "02/04/2021    Tdap vaccine, age 7 year and older (BOOSTRIX, ADACEL) 09/01/2011    Zoster vaccine, recombinant, adult (SHINGRIX) 02/09/2024, 04/11/2024        ROS  Negative, except as discussed in HPI     Vitals  /78   Pulse 79   Temp 36.8 °C (98.3 °F)   Ht 1.549 m (5' 1\")   Wt 104 kg (228 lb 3.2 oz)   SpO2 93%   BMI 43.12 kg/m²      Physical Exam    Relevant Results  Lab Results   Component Value Date    WBC 8.8 02/21/2024    WBC 10.5 02/09/2024    HGB 13.5 02/21/2024    HGB 13.3 02/09/2024    HCT 42.0 02/21/2024    HCT 41.6 02/09/2024    MCV 82 02/21/2024    MCV 82 02/09/2024     02/21/2024     02/09/2024     Lab Results   Component Value Date     06/25/2024     02/21/2024    K 4.5 06/25/2024    K 4.0 02/21/2024     06/25/2024    CL 99 02/21/2024    CO2 27 06/25/2024    CO2 25 02/21/2024    BUN 21 06/25/2024    BUN 26 (H) 02/21/2024    CREATININE 1.10 06/25/2024    CREATININE 1.30 02/21/2024    CALCIUM 9.8 06/25/2024    CALCIUM 9.7 02/21/2024    PROT 8.3 (H) 02/21/2024    PROT 7.2 02/09/2024    BILITOT 0.2 02/21/2024    BILITOT 0.4 02/09/2024    ALKPHOS 76 02/21/2024    ALKPHOS 86 02/09/2024    ALT 16 02/21/2024    ALT 19 02/09/2024    AST 16 02/21/2024    AST 17 02/09/2024    GLUCOSE 93 06/25/2024    GLUCOSE 125 (H) 02/21/2024     Lab Results   Component Value Date    HGBA1C 6.5 03/06/2025     Lab Results   Component Value Date    TSH 2.63 02/09/2024      Lab Results   Component Value Date    CHOL 218 (H) 02/09/2024    TRIG 293 (H) 02/09/2024    HDL 39.0 (L) 02/09/2024           Assessment/Plan   Shanti was seen today for follow-up.  Diagnoses and all orders for this visit:  Prediabetes (Primary)  Comments:  Rybelsus for weight loss, recheck a1c  Orders:  -     POCT glycosylated hemoglobin (Hb A1C) manually resulted  -     semaglutide (Rybelsus) 3 mg tablet; Take 1 tablet (3 mg) by mouth once daily.  -     Comprehensive Metabolic Panel; Future  -     Hemoglobin A1C; " Future  -     Lipid Panel; Future  -     Vitamin B12; Future  -     Vitamin D 25-Hydroxy,Total (for eval of Vitamin D levels); Future  -     CBC; Future  Anxiety  Comments:  tolerable, declines medications, monitor  Hypothyroidism, unspecified type  -     TSH with reflex to Free T4 if abnormal; Future         Counseling:   Medication education:   -Education:  The patient is counseled regarding potential side-effects of any and all new medications  -Understanding:  Patient expressed understanding of information discussed today  -Adherence:  No barriers to adherence identified    Final treatment plan is a result of shared decision making with patient.         Rip Lynn MD

## 2025-03-07 ENCOUNTER — TELEPHONE (OUTPATIENT)
Dept: PRIMARY CARE | Facility: CLINIC | Age: 50
End: 2025-03-07
Payer: COMMERCIAL

## 2025-03-07 NOTE — TELEPHONE ENCOUNTER
PT STATES HER PHARMACY [facility] TELLING HER HER RX RYBELSUS IS NOT COVERED PT REQUESTING AN ALTERNATIVE PT ASKING IS THERE ANYTHING SHE NEEDS TO DO

## 2025-03-10 ENCOUNTER — PATIENT MESSAGE (OUTPATIENT)
Dept: PRIMARY CARE | Facility: CLINIC | Age: 50
End: 2025-03-10
Payer: COMMERCIAL

## 2025-03-11 NOTE — TELEPHONE ENCOUNTER
Rybelsus denied   Your plan only covers this drug when it is used for certain health conditions. Covered use is for type 2 diabetes.

## 2025-03-12 NOTE — TELEPHONE ENCOUNTER
Notified pt via vm and instructed pt to give the office an call back if further assistance is needed.

## 2025-03-16 DIAGNOSIS — I10 PRIMARY HYPERTENSION: Primary | ICD-10-CM

## 2025-03-16 DIAGNOSIS — F41.9 ANXIETY: ICD-10-CM

## 2025-03-16 DIAGNOSIS — Z76.0 MEDICATION REFILL: ICD-10-CM

## 2025-03-18 RX ORDER — METOPROLOL TARTRATE 25 MG/1
12.5 TABLET, FILM COATED ORAL 2 TIMES DAILY
Qty: 90 TABLET | Refills: 1 | Status: SHIPPED | OUTPATIENT
Start: 2025-03-18

## 2025-03-18 RX ORDER — OMEPRAZOLE 20 MG/1
20 CAPSULE, DELAYED RELEASE ORAL DAILY
Qty: 90 CAPSULE | Refills: 1 | Status: SHIPPED | OUTPATIENT
Start: 2025-03-18

## 2025-03-18 RX ORDER — LEVOTHYROXINE SODIUM 100 UG/1
100 TABLET ORAL
Qty: 90 TABLET | Refills: 1 | Status: SHIPPED | OUTPATIENT
Start: 2025-03-18 | End: 2025-09-14

## 2025-03-18 RX ORDER — SERTRALINE HYDROCHLORIDE 100 MG/1
100 TABLET, FILM COATED ORAL DAILY
Qty: 90 TABLET | Refills: 1 | Status: SHIPPED | OUTPATIENT
Start: 2025-03-18

## 2025-03-24 ENCOUNTER — APPOINTMENT (OUTPATIENT)
Dept: PHYSICAL THERAPY | Facility: CLINIC | Age: 50
End: 2025-03-24
Payer: COMMERCIAL

## 2025-03-26 ENCOUNTER — TREATMENT (OUTPATIENT)
Dept: PHYSICAL THERAPY | Facility: CLINIC | Age: 50
End: 2025-03-26
Payer: COMMERCIAL

## 2025-03-26 DIAGNOSIS — M53.3 ACUTE COCCYGEAL PAIN: ICD-10-CM

## 2025-03-26 DIAGNOSIS — M25.551 RIGHT HIP PAIN: ICD-10-CM

## 2025-03-26 PROCEDURE — 97113 AQUATIC THERAPY/EXERCISES: CPT | Mod: GP

## 2025-03-26 NOTE — PROGRESS NOTES
Physical Therapy Treatment    Patient Name: Shanti Gonsales  MRN: [id number]  Encounter date:  3/26/2025  Time Calculation  Start Time: 1130  Stop Time: 1210  Time Calculation (min): 40 min     PT Therapeutic Procedures Time Entry  Aquatic Therapy Time Entry: 40       Visit Number:  2 (including evaluation)  Planned total visits: 10-20  Visits Authorized/Insurance Coverage:  60    Current Problem  Problem List Items Addressed This Visit             ICD-10-CM    Right hip pain M25.551    Acute coccygeal pain M53.3         Precautions   Back pain considerations    Pain   5-6    Subjective  First follow up today.  To start with aquatics.    Objective  Guarded trunk AROM    Treatment:  Aquatics started today:    Walk FWD/BWD/side step 3 laps across pool    Attempted LE strengthening but pt. Reporting increased R lumbar/SI pain    Deep end:    Hang x 5'  Bicycle x 1  Hang x 2'  X country x 1'  Hang x 2'  Hip ABD/ADD x 1'  Hang x 2'  DKC x 10 repetitions    Hang x 5    Walk x 2 laps to reintroduce gravity    Current HEP:  To be developed    Activity tolerance:  Good     OP EDUCATION:   Benefits of decompression in water    Assessment: Fair tolerance to first session. Focus on deep end for pain control and decompression.  Improved posture in water.  No increased pain.     Pain end of session: Unchanged    Plan:     Continue with current POC/no changes    Assessment of current progress against goals:  Insufficient treatment time to assess progress    Goals:   See elisabeth

## 2025-04-01 ENCOUNTER — TREATMENT (OUTPATIENT)
Dept: PHYSICAL THERAPY | Facility: CLINIC | Age: 50
End: 2025-04-01
Payer: COMMERCIAL

## 2025-04-01 DIAGNOSIS — M53.3 ACUTE COCCYGEAL PAIN: ICD-10-CM

## 2025-04-01 DIAGNOSIS — M25.551 RIGHT HIP PAIN: ICD-10-CM

## 2025-04-01 PROCEDURE — 97113 AQUATIC THERAPY/EXERCISES: CPT | Mod: GP

## 2025-04-01 NOTE — PROGRESS NOTES
Physical Therapy Treatment     Patient Name: Shanti Gonsales  MRN: [id number]  Encounter date:  4/1/2025  Time Calculation  Start Time: 1115  Stop Time: 1155  Time Calculation (min): 40 min  PT Therapeutic Procedures Time Entry  Aquatic Therapy Time Entry: 40     Visit Number:  3 (including evaluation)  Planned total visits: 10-20  Visits Authorized/Insurance Coverage:  60     Current Problem  Problem List Items Addressed This Visit               ICD-10-CM     Right hip pain M25.551     Acute coccygeal pain M53.3            Precautions   Back pain considerations     Pain   0     Subjective  Felt ok after first trial of aquatics last session.  No pain on arrival today.     Objective  Guarded trunk AROM     Treatment:  Aquatics started today:     Walk FWD/BWD/side step 3 laps across pool     At rail:  Heel/toe raise x 10 each  3 way x 10 each B/L     Deep end:     Hang x 5'  Bicycle x 1  Hang x 2'  X country x 1'  Hang x 2'  Hip ABD/ADD x 1'  Hang x 2'  DKC x 10 repetitions     Hang x 5     Walk x 2 laps to reintroduce gravity    HS stretch 30 sec x 2 R/L B/L UE HR support at steps     Current HEP:  To be developed     Activity tolerance:  Good      OP EDUCATION:   Benefits of decompression in water     Assessment: Tolerates added exercise today with reduced overall back pain symptoms since starting aquatic PT.    Pain end of session: Unchanged     Plan:  Continue with current POC/no changes     Assessment of current progress against goals:  Insufficient treatment time to assess progress     Goals:   See elisabeth

## 2025-04-03 ENCOUNTER — TREATMENT (OUTPATIENT)
Dept: PHYSICAL THERAPY | Facility: CLINIC | Age: 50
End: 2025-04-03
Payer: COMMERCIAL

## 2025-04-03 DIAGNOSIS — M53.3 ACUTE COCCYGEAL PAIN: ICD-10-CM

## 2025-04-03 DIAGNOSIS — M25.551 RIGHT HIP PAIN: ICD-10-CM

## 2025-04-03 PROCEDURE — 97113 AQUATIC THERAPY/EXERCISES: CPT | Mod: GP

## 2025-04-04 NOTE — PROGRESS NOTES
Physical Therapy Treatment     Patient Name: Shanti Gonsales  MRN: [id number]  Encounter date:  4/3/2025  Time Calculation  Start Time: 1050  Stop Time: 1130  Time Calculation (min): 40 min  PT Therapeutic Procedures Time Entry  Aquatic Therapy Time Entry: 40     Visit Number:  4 (including evaluation)  Planned total visits: 10-20  Visits Authorized/Insurance Coverage:  60     Current Problem  Problem List Items Addressed This Visit               ICD-10-CM     Right hip pain M25.551     Acute coccygeal pain M53.3            Precautions   Back pain considerations     Pain   6-7/10     Subjective  Reports painful day yesterday but a little better today.  Denies falls or injury.  Agreeable to continue with PT.      Objective  Gait WNL     Treatment:  Aquatics continued today:     Walk FWD/BWD/side step 3 laps across pool     Deep end:     Hang x 10'  Bicycle x 1  Hang x 2'  X country x 1'  Hang x 2'  Hip ABD/ADD x 1'  Hang x 2'  DKC x 10 repetitions     Hang x 10     Walk x 2 laps to reintroduce gravity     HS stretch 30 sec x 2 R/L B/L UE HR support at steps     Current HEP:  To be developed     Activity tolerance:  Good      OP EDUCATION:   Benefits of decompression in water     Assessment: Increased duration of hang time today for pain relief and decompression.  Patient tolerates and reports reduced pain at end of session.      Pain end of session: 3-4     Plan:  Continue with current POC/no changes     Assessment of current progress against goals:  Insufficient treatment time to assess progress     Goals:   See elisabeth

## 2025-04-08 ENCOUNTER — TREATMENT (OUTPATIENT)
Dept: PHYSICAL THERAPY | Facility: CLINIC | Age: 50
End: 2025-04-08
Payer: COMMERCIAL

## 2025-04-08 DIAGNOSIS — M53.3 ACUTE COCCYGEAL PAIN: ICD-10-CM

## 2025-04-08 DIAGNOSIS — M25.551 RIGHT HIP PAIN: ICD-10-CM

## 2025-04-08 PROCEDURE — 97035 APP MDLTY 1+ULTRASOUND EA 15: CPT | Mod: GP

## 2025-04-08 PROCEDURE — 97140 MANUAL THERAPY 1/> REGIONS: CPT | Mod: GP

## 2025-04-08 NOTE — PROGRESS NOTES
Physical Therapy Treatment     Patient Name: Shanti Gonsales  MRN: [id number]  Encounter date:  4/8/2025  Time Calculation  Start Time: 1130  Stop Time: 1210  Time Calculation (min): 40 min  PT Therapeutic Procedures Time Entry  Aquatic Therapy Time Entry: 40     Visit Number:  5 (including evaluation)  Planned total visits: 10-20  Visits Authorized/Insurance Coverage:  60     Current Problem  Problem List Items Addressed This Visit               ICD-10-CM     Right hip pain M25.551     Acute coccygeal pain M53.3            Precautions   Back pain considerations     Pain  4/10     Subjective  Wants to do land today.  Reports 4/10 pain lumbar/gluteal B/L     Objective  Gait WNL  Tender B/L lumbar parapsinals  Limited trunk extension due to pain.      Treatment:  In R S/L:    US at 1.5 W, 1.0 Mhz 100% continuous x 10 minutes to B/L lumbar paraspinals for pain control  STM B/L paraspinals for pain control x 30 minutes    DNP:  Aquatics continued today:     Walk FWD/BWD/side step 3 laps across pool     Deep end:     Hang x 10'  Bicycle x 1  Hang x 2'  X country x 1'  Hang x 2'  Hip ABD/ADD x 1'  Hang x 2'  DKC x 10 repetitions     Hang x 10     Walk x 2 laps to reintroduce gravity     HS stretch 30 sec x 2 R/L B/L UE HR support at steps     Current HEP:  To be developed     Activity tolerance:  Good      OP EDUCATION:   Benefits of decompression in water     Assessment:  Land based tx today with introduction of US and manual for pain control.  Successful reduction of pain with today's tx.      Pain end of session: 1-2     Plan:  Continue with current POC/no changes     Assessment of current progress against goals:  Insufficient treatment time to assess progress     Goals:   See elisabeth

## 2025-04-10 ENCOUNTER — CLINICAL SUPPORT (OUTPATIENT)
Dept: PHYSICAL THERAPY | Facility: CLINIC | Age: 50
End: 2025-04-10
Payer: COMMERCIAL

## 2025-04-10 DIAGNOSIS — M25.551 RIGHT HIP PAIN: ICD-10-CM

## 2025-04-10 DIAGNOSIS — M53.3 ACUTE COCCYGEAL PAIN: ICD-10-CM

## 2025-04-10 PROCEDURE — 97035 APP MDLTY 1+ULTRASOUND EA 15: CPT | Mod: GP

## 2025-04-10 PROCEDURE — 97140 MANUAL THERAPY 1/> REGIONS: CPT | Mod: GP

## 2025-04-11 NOTE — PROGRESS NOTES
"       Physical Therapy Treatment     Patient Name: Shanti Gonsales  MRN: [id number]  Encounter date:  4/10/2025  Time Calculation  Start Time: 1115  Stop Time: 1155  Time Calculation (min): 40 min  PT Therapeutic Procedures Time Entry  Aquatic Therapy Time Entry: 40     Visit Number:  6 (including evaluation)  Planned total visits: 10-20  Visits Authorized/Insurance Coverage:  60     Current Problem  Problem List Items Addressed This Visit               ICD-10-CM     Right hip pain M25.551     Acute coccygeal pain M53.3            Precautions   Back pain considerations     Pain  3-4/10     Subjective  US and manual \"helped a little\" after last session.       Objective  Gait WNL  Tender B/L lumbar paraspinals  +Trp R glute med and greater trochanteric bursae  Limited trunk extension due to pain.      Treatment:  In R S/L:  Continued for an attempt at more localized pain control     US at 1.5 W, 1.0 Mhz 100% continuous x 10 minutes to B/L lumbar paraspinals for pain control  STM B/L paraspinals for pain control x 30 minutes     DNP:  Aquatics continued today:     Walk FWD/BWD/side step 3 laps across pool     Deep end:     Hang x 10'  Bicycle x 1  Hang x 2'  X country x 1'  Hang x 2'  Hip ABD/ADD x 1'  Hang x 2'  DKC x 10 repetitions     Hang x 10     Walk x 2 laps to reintroduce gravity     HS stretch 30 sec x 2 R/L B/L UE HR support at steps     Current HEP:  To be developed     Activity tolerance:  Good      OP EDUCATION:   Benefits of decompression in water     Assessment:  Continued steady dosage of US and manual for soft tissue viability and pain control. Pain reduced with today's treatment.     Pain end of session: 1-2     Plan:  Continue with current POC/no changes     Assessment of current progress against goals:  Insufficient treatment time to assess progress     Goals:   See eval        "

## 2025-04-14 ENCOUNTER — CLINICAL SUPPORT (OUTPATIENT)
Dept: PHYSICAL THERAPY | Facility: CLINIC | Age: 50
End: 2025-04-14
Payer: COMMERCIAL

## 2025-04-14 DIAGNOSIS — M25.551 RIGHT HIP PAIN: ICD-10-CM

## 2025-04-14 DIAGNOSIS — M53.3 ACUTE COCCYGEAL PAIN: ICD-10-CM

## 2025-04-14 PROCEDURE — 97035 APP MDLTY 1+ULTRASOUND EA 15: CPT | Mod: GP

## 2025-04-14 PROCEDURE — 97140 MANUAL THERAPY 1/> REGIONS: CPT | Mod: GP

## 2025-04-15 ENCOUNTER — APPOINTMENT (OUTPATIENT)
Dept: CARDIOLOGY | Facility: CLINIC | Age: 50
End: 2025-04-15
Payer: COMMERCIAL

## 2025-04-15 NOTE — PROGRESS NOTES
Physical Therapy Treatment     Patient Name: Shanti Gonsales  MRN: [id number]  Encounter date:  4/14/2025  Time Calculation  Start Time: 1130  Stop Time: 1210  Time Calculation (min): 40 min  PT Therapeutic Procedures Time Entry  Aquatic Therapy Time Entry: 40     Visit Number:  7 (including evaluation)  Planned total visits: 10-20  Visits Authorized/Insurance Coverage:  60     Current Problem  Problem List Items Addressed This Visit               ICD-10-CM     Right hip pain M25.551     Acute coccygeal pain M53.3            Precautions   Back/hip pain considerations     Pain  5 lumbar/sacral; centralized     Subjective  Pain more centralized to mid back today.     Objective  Guarded trunk AROM    Treatment:  In R S/L:  Continued for an attempt at more localized pain control     US at 1.5 W, 1.0 Mhz 100% continuous x 10 minutes to B/L lumbar paraspinals for pain control  STM B/L paraspinals for pain control x 30 minutes     DNP:  Walk FWD/BWD/side step 3 laps across pool     Deep end:     Hang x 10'  Bicycle x 1  Hang x 2'  X country x 1'  Hang x 2'  Hip ABD/ADD x 1'  Hang x 2'  DKC x 10 repetitions     Hang x 10     Walk x 2 laps to reintroduce gravity     HS stretch 30 sec x 2 R/L B/L UE HR support at steps     Current HEP:  To be developed     Activity tolerance:  Good      OP EDUCATION:   Benefits of decompression in water     Assessment: Inconsistent pain control.  Continued dosage of manual and US for temporary pain relief in hopes to transition to more long lasting pain control.  Successful reduction after immediate treatment but overall lumbar pain symptoms continue to fluctuate.      Pain end of session: 2-3     Plan:  Continue with current POC/no changes     Assessment of current progress against goals:  Insufficient treatment time to assess progress     Goals:

## 2025-04-16 ENCOUNTER — CLINICAL SUPPORT (OUTPATIENT)
Dept: PHYSICAL THERAPY | Facility: CLINIC | Age: 50
End: 2025-04-16
Payer: COMMERCIAL

## 2025-04-16 DIAGNOSIS — M53.3 ACUTE COCCYGEAL PAIN: ICD-10-CM

## 2025-04-16 DIAGNOSIS — M25.551 RIGHT HIP PAIN: ICD-10-CM

## 2025-04-16 PROCEDURE — 97140 MANUAL THERAPY 1/> REGIONS: CPT | Mod: GP

## 2025-04-17 NOTE — PROGRESS NOTES
Physical Therapy Treatment     Patient Name: Shanti Gonsales  MRN: [id number]  Encounter date:  4/14/2025  Time Calculation  Start Time: 1130  Stop Time: 1210  Time Calculation (min): 40 min  PT Therapeutic Procedures Time Entry  Manual time entry: 35 minutes  Needle insertion time 5 minutes     Visit Number:  8 (including evaluation)  Planned total visits: 10-20  Visits Authorized/Insurance Coverage:  60     Current Problem  Problem List Items Addressed This Visit               ICD-10-CM     Right hip pain M25.551     Acute coccygeal pain M53.3            Precautions   Back/hip pain considerations     Pain  4     Subjective  Symptoms about the same.     Objective  Tender lumbar paraspinals     Treatment:  Seated, supported over plinth     DN to lumbar paraspinals, R glute  .25 x 30 mm to L/R L5 paraspinals   .25 x 30 mm x 1 to R gluteal  5 minute needle insertion time    STM B/L paraspinals for pain control x 35 minutes upon completion     DNP: No aquatics 4/17  Walk FWD/BWD/side step 3 laps across pool     Deep end:     Hang x 10'  Bicycle x 1  Hang x 2'  X country x 1'  Hang x 2'  Hip ABD/ADD x 1'  Hang x 2'  DKC x 10 repetitions     Hang x 10     Walk x 2 laps to reintroduce gravity     HS stretch 30 sec x 2 R/L B/L UE HR support at steps     Current HEP:  To be developed     Activity tolerance:  Good      OP EDUCATION:   Benefits of decompression in water     Assessment: Inconsistent pain control.  Added DN and manual for more aggressive approach at gait control.      Pain end of session: 3-4     Plan:  Continue with current POC/no changes     Assessment of current progress against goals:  Insufficient treatment time to assess progress     Goals:  See evaluation

## 2025-04-21 ENCOUNTER — APPOINTMENT (OUTPATIENT)
Dept: PHYSICAL THERAPY | Facility: CLINIC | Age: 50
End: 2025-04-21
Payer: COMMERCIAL

## 2025-04-24 ENCOUNTER — APPOINTMENT (OUTPATIENT)
Dept: PHYSICAL THERAPY | Facility: CLINIC | Age: 50
End: 2025-04-24
Payer: COMMERCIAL

## 2025-05-05 ENCOUNTER — APPOINTMENT (OUTPATIENT)
Dept: PHYSICAL THERAPY | Facility: CLINIC | Age: 50
End: 2025-05-05
Payer: COMMERCIAL

## 2025-05-16 DIAGNOSIS — R73.03 PREDIABETES: ICD-10-CM

## 2025-05-21 RX ORDER — METFORMIN HYDROCHLORIDE 500 MG/1
TABLET, EXTENDED RELEASE ORAL
Qty: 90 TABLET | Refills: 3 | Status: SHIPPED | OUTPATIENT
Start: 2025-05-21 | End: 2026-05-21

## 2025-05-21 NOTE — TELEPHONE ENCOUNTER
Prescription request received and populated   Pharmacy populated  Last Office Visit: 3/06/25 for DM follow up  Has upcoming ov 6/19/25 for physical

## 2025-06-06 DIAGNOSIS — E03.9 HYPOTHYROIDISM, UNSPECIFIED TYPE: ICD-10-CM

## 2025-06-06 DIAGNOSIS — R73.03 PREDIABETES: ICD-10-CM

## 2025-06-14 LAB
25(OH)D3+25(OH)D2 SERPL-MCNC: 33 NG/ML (ref 30–100)
ALBUMIN SERPL-MCNC: 4.4 G/DL (ref 3.6–5.1)
ALP SERPL-CCNC: 60 U/L (ref 37–153)
ALT SERPL-CCNC: 21 U/L (ref 6–29)
ANION GAP SERPL CALCULATED.4IONS-SCNC: 9 MMOL/L (CALC) (ref 7–17)
AST SERPL-CCNC: 17 U/L (ref 10–35)
BILIRUB SERPL-MCNC: 0.4 MG/DL (ref 0.2–1.2)
BUN SERPL-MCNC: 22 MG/DL (ref 7–25)
CALCIUM SERPL-MCNC: 9.6 MG/DL (ref 8.6–10.4)
CHLORIDE SERPL-SCNC: 102 MMOL/L (ref 98–110)
CHOLEST SERPL-MCNC: 129 MG/DL
CHOLEST/HDLC SERPL: 3.5 (CALC)
CO2 SERPL-SCNC: 28 MMOL/L (ref 20–32)
CREAT SERPL-MCNC: 0.92 MG/DL (ref 0.5–1.03)
EGFRCR SERPLBLD CKD-EPI 2021: 75 ML/MIN/1.73M2
ERYTHROCYTE [DISTWIDTH] IN BLOOD BY AUTOMATED COUNT: 13.8 % (ref 11–15)
EST. AVERAGE GLUCOSE BLD GHB EST-MCNC: 151 MG/DL
EST. AVERAGE GLUCOSE BLD GHB EST-SCNC: 8.4 MMOL/L
GLUCOSE SERPL-MCNC: 116 MG/DL (ref 65–99)
HBA1C MFR BLD: 6.9 %
HCT VFR BLD AUTO: 40.9 % (ref 35–45)
HDLC SERPL-MCNC: 37 MG/DL
HGB BLD-MCNC: 12.8 G/DL (ref 11.7–15.5)
LDLC SERPL CALC-MCNC: 66 MG/DL (CALC)
MCH RBC QN AUTO: 25.7 PG (ref 27–33)
MCHC RBC AUTO-ENTMCNC: 31.3 G/DL (ref 32–36)
MCV RBC AUTO: 82.1 FL (ref 80–100)
NONHDLC SERPL-MCNC: 92 MG/DL (CALC)
PLATELET # BLD AUTO: 326 THOUSAND/UL (ref 140–400)
PMV BLD REES-ECKER: 9.6 FL (ref 7.5–12.5)
POTASSIUM SERPL-SCNC: 4.6 MMOL/L (ref 3.5–5.3)
PROT SERPL-MCNC: 7.1 G/DL (ref 6.1–8.1)
RBC # BLD AUTO: 4.98 MILLION/UL (ref 3.8–5.1)
SODIUM SERPL-SCNC: 139 MMOL/L (ref 135–146)
TRIGL SERPL-MCNC: 189 MG/DL
TSH SERPL-ACNC: 1.81 MIU/L
VIT B12 SERPL-MCNC: 458 PG/ML (ref 200–1100)
WBC # BLD AUTO: 8.2 THOUSAND/UL (ref 3.8–10.8)

## 2025-06-19 ENCOUNTER — OFFICE VISIT (OUTPATIENT)
Dept: PRIMARY CARE | Facility: CLINIC | Age: 50
End: 2025-06-19
Payer: COMMERCIAL

## 2025-06-19 VITALS
BODY MASS INDEX: 43.23 KG/M2 | HEIGHT: 61 IN | HEART RATE: 72 BPM | OXYGEN SATURATION: 95 % | TEMPERATURE: 97.6 F | WEIGHT: 229 LBS | SYSTOLIC BLOOD PRESSURE: 134 MMHG | DIASTOLIC BLOOD PRESSURE: 88 MMHG

## 2025-06-19 DIAGNOSIS — Z00.00 ANNUAL PHYSICAL EXAM: Primary | ICD-10-CM

## 2025-06-19 DIAGNOSIS — Z12.11 SCREENING FOR COLON CANCER: ICD-10-CM

## 2025-06-19 DIAGNOSIS — Z23 ENCOUNTER FOR IMMUNIZATION: ICD-10-CM

## 2025-06-19 DIAGNOSIS — R42 DIZZINESS AND GIDDINESS: ICD-10-CM

## 2025-06-19 DIAGNOSIS — Z12.31 ENCOUNTER FOR SCREENING MAMMOGRAM FOR MALIGNANT NEOPLASM OF BREAST: ICD-10-CM

## 2025-06-19 DIAGNOSIS — E11.9 TYPE 2 DIABETES MELLITUS WITHOUT COMPLICATION, WITHOUT LONG-TERM CURRENT USE OF INSULIN: ICD-10-CM

## 2025-06-19 PROBLEM — R52 PAIN, UNSPECIFIED: Status: RESOLVED | Noted: 2023-02-28 | Resolved: 2025-06-19

## 2025-06-19 PROCEDURE — 3075F SYST BP GE 130 - 139MM HG: CPT | Performed by: FAMILY MEDICINE

## 2025-06-19 PROCEDURE — 3008F BODY MASS INDEX DOCD: CPT | Performed by: FAMILY MEDICINE

## 2025-06-19 PROCEDURE — 90471 IMMUNIZATION ADMIN: CPT | Performed by: FAMILY MEDICINE

## 2025-06-19 PROCEDURE — 3044F HG A1C LEVEL LT 7.0%: CPT | Performed by: FAMILY MEDICINE

## 2025-06-19 PROCEDURE — 99214 OFFICE O/P EST MOD 30 MIN: CPT | Performed by: FAMILY MEDICINE

## 2025-06-19 PROCEDURE — 99396 PREV VISIT EST AGE 40-64: CPT | Performed by: FAMILY MEDICINE

## 2025-06-19 PROCEDURE — 3079F DIAST BP 80-89 MM HG: CPT | Performed by: FAMILY MEDICINE

## 2025-06-19 PROCEDURE — 90715 TDAP VACCINE 7 YRS/> IM: CPT | Performed by: FAMILY MEDICINE

## 2025-06-19 RX ORDER — AZELASTINE 1 MG/ML
2 SPRAY, METERED NASAL 2 TIMES DAILY
Qty: 30 ML | Refills: 0 | Status: SHIPPED | OUTPATIENT
Start: 2025-06-19

## 2025-06-19 RX ORDER — MECLIZINE HYDROCHLORIDE 25 MG/1
25 TABLET ORAL 3 TIMES DAILY PRN
COMMUNITY

## 2025-06-19 RX ORDER — LORATADINE 10 MG/1
10 TABLET ORAL DAILY
COMMUNITY

## 2025-06-19 RX ORDER — TIRZEPATIDE 2.5 MG/.5ML
2.5 INJECTION, SOLUTION SUBCUTANEOUS WEEKLY
Qty: 4 PEN | Refills: 0 | Status: SHIPPED | OUTPATIENT
Start: 2025-06-19 | End: 2026-06-19

## 2025-06-19 ASSESSMENT — PAIN SCALES - GENERAL: PAINLEVEL_OUTOF10: 0-NO PAIN

## 2025-06-19 ASSESSMENT — LIFESTYLE VARIABLES: HOW MANY STANDARD DRINKS CONTAINING ALCOHOL DO YOU HAVE ON A TYPICAL DAY: PATIENT DOES NOT DRINK

## 2025-06-19 NOTE — PATIENT INSTRUCTIONS
-Call to schedule mammogram and colonscopy  -Start allergy medications regularly     Diabetes  -start mounjaro injections once a week

## 2025-06-19 NOTE — PROGRESS NOTES
History Of Present Illness  Shanti Gonsales is a 49 y.o. female presenting for Annual Exam (A1c results 6.9% from 6/13/2025.)  .    HPI     Feels unsteady, like vertigo. Saw ENT. Saw neurology. Did PT for balance therapy. Did PT for back.         Past Medical History  Patient Active Problem List    Diagnosis Date Noted    Right hip pain 02/28/2025    Acute coccygeal pain 02/28/2025    Vertigo 03/12/2024    Angina pectoris 02/22/2024    Anxiety 02/22/2024    Carpal tunnel syndrome of left wrist 02/22/2024    Cystitis 02/22/2024    Dizziness and giddiness 02/22/2024    Eczema 02/22/2024    Gastroesophageal reflux disease 02/22/2024    Hand pain 02/22/2024    Kidney stone 02/22/2024    Lightheadedness 02/22/2024    Mixed hyperlipidemia 02/22/2024    Obstructive sleep apnea syndrome 02/22/2024    Primary hypertension 02/22/2024    Venous thrombosis 02/22/2024    Prediabetes 02/22/2024    Hypothyroidism 02/09/2024    Pain, unspecified 02/28/2023    Seasonal allergic rhinitis 08/23/2019        Medications  Medications ordered prior to the current encounter[1]     Surgical History  She has no past surgical history on file.     Social History  She reports that she has never smoked. She has never been exposed to tobacco smoke. She has never used smokeless tobacco. She reports that she does not drink alcohol and does not use drugs.    Family History  Family History[2]     Allergies  Penicillins and Sulfa (sulfonamide antibiotics)    Immunizations  Immunization History   Administered Date(s) Administered    Flu vaccine (IIV4), preservative free *Check age/dose* 10/13/2020, 10/19/2021    Flu vaccine, quadrivalent, no egg protein, age 6 month or greater (FLUCELVAX) 10/16/2020    Flu vaccine, trivalent, preservative free, age 6 months and greater (Fluarix/Fluzone/Flulaval) 12/09/2015    Influenza, injectable, quadrivalent 10/10/2019, 10/04/2022    Influenza, seasonal, injectable 12/09/2015    Moderna SARS-CoV-2 Vaccination  "01/08/2021, 02/04/2021    Tdap vaccine, age 7 year and older (BOOSTRIX, ADACEL) 09/01/2011    Zoster vaccine, recombinant, adult (SHINGRIX) 02/09/2024, 04/11/2024        ROS  Negative, except as discussed in HPI     Vitals  /88   Pulse 72   Temp 36.4 °C (97.6 °F)   Ht 1.549 m (5' 1\")   Wt 104 kg (229 lb)   SpO2 95%   BMI 43.27 kg/m²      Physical Exam    Relevant Results  Lab Results   Component Value Date    WBC 8.2 06/13/2025    WBC 8.8 02/21/2024    HGB 12.8 06/13/2025    HGB 13.5 02/21/2024    HCT 40.9 06/13/2025    HCT 42.0 02/21/2024    MCV 82.1 06/13/2025    MCV 82 02/21/2024     06/13/2025     02/21/2024     Lab Results   Component Value Date     06/13/2025     06/25/2024    K 4.6 06/13/2025    K 4.5 06/25/2024     06/13/2025     06/25/2024    CO2 28 06/13/2025    CO2 27 06/25/2024    BUN 22 06/13/2025    BUN 21 06/25/2024    CREATININE 0.92 06/13/2025    CREATININE 1.10 06/25/2024    CALCIUM 9.6 06/13/2025    CALCIUM 9.8 06/25/2024    PROT 7.1 06/13/2025    PROT 8.3 (H) 02/21/2024    BILITOT 0.4 06/13/2025    BILITOT 0.2 02/21/2024    ALKPHOS 60 06/13/2025    ALKPHOS 76 02/21/2024    ALT 21 06/13/2025    ALT 16 02/21/2024    AST 17 06/13/2025    AST 16 02/21/2024    GLUCOSE 116 (H) 06/13/2025    GLUCOSE 93 06/25/2024     Lab Results   Component Value Date    HGBA1C 6.9 (H) 06/13/2025     Lab Results   Component Value Date    TSH 1.81 06/13/2025      Lab Results   Component Value Date    CHOL 129 06/13/2025    TRIG 189 (H) 06/13/2025    HDL 37 (L) 06/13/2025           Assessment/Plan   There are no diagnoses linked to this encounter.       Counseling:   Medication education:   -Education:  The patient is counseled regarding potential side-effects of any and all new medications  -Understanding:  Patient expressed understanding of information discussed today  -Adherence:  No barriers to adherence identified    Final treatment plan is a result of shared decision " making with patient.         Rip Lynn MD        [1]   Current Outpatient Medications   Medication Sig Dispense Refill    albuterol 90 mcg/actuation inhaler Inhale 2 puffs 4 times a day. 18 g 0    aspirin 81 mg EC tablet Take 1 tablet (81 mg) by mouth once daily.      atorvastatin (Lipitor) 20 mg tablet Take 1 tablet (20 mg) by mouth once daily. 90 tablet 3    azelastine (Astelin) 137 mcg (0.1 %) nasal spray Administer into affected nostril(s) every 12 hours.      fenofibrate (Tricor) 145 mg tablet Take 1 tablet (145 mg) by mouth once daily. 30 tablet 11    guaiFENesin (Mucinex) 600 mg 12 hr tablet Take 2 tablets (1,200 mg) by mouth 2 times a day as needed for cough or congestion. Do not crush, chew, or split. 30 tablet 0    levothyroxine (Synthroid, Levoxyl) 100 mcg tablet TAKE 1 TABLET BY MOUTH EVERY DAY IN THE MORNING ON EMPTY STOMACH FOR 90 DAYS 90 tablet 1    meloxicam (Mobic) 15 mg tablet TAKE 1 TABLET BY MOUTH EVERY DAY 30 tablet 0    metFORMIN  mg 24 hr tablet TAKE 1 TABLET (500 MG) BY MOUTH ONCE DAILY IN THE EVENING. TAKE WITH MEALS FOR 8 DAYS. DO NOT CRUSH, CHEW, OR SPLIT. 90 tablet 3    metoprolol succinate XL (Toprol-XL) 25 mg 24 hr tablet Take 1 tablet (25 mg) by mouth once daily. Do not crush or chew. 90 tablet 3    metoprolol tartrate (Lopressor) 25 mg tablet TAKE 1/2 TABLET BY MOUTH TWICE DAILY 90 tablet 1    mometasone (Elocon) 0.1 % cream 1 Application.      omeprazole (PriLOSEC) 20 mg DR capsule TAKE 1 CAPSULE BY MOUTH EVERY DAY 90 capsule 1    semaglutide (Rybelsus) 3 mg tablet Take 1 tablet (3 mg) by mouth once daily. 30 tablet 5    sertraline (Zoloft) 100 mg tablet TAKE 1 TABLET BY MOUTH EVERY DAY 90 tablet 1     No current facility-administered medications for this visit.   [2]   Family History  Problem Relation Name Age of Onset    Breast cancer Mother  35    Lung cancer Father Jabier     COPD Father Jabier     Emphysema Father Jabier     Heart failure Father Jabier     Prostate cancer Father  Jabier     Diabetes Sister Carmelita     Drug abuse Brother        current facility-administered medications for this visit.   [2]   Family History  Problem Relation Name Age of Onset    Breast cancer Mother  35    Lung cancer Father Jabier     COPD Father Jabier     Emphysema Father Jabier     Heart failure Father Jabier     Prostate cancer Father Jabier     Diabetes Sister Carmelita     Drug abuse Brother

## 2025-06-23 ENCOUNTER — APPOINTMENT (OUTPATIENT)
Dept: RADIOLOGY | Facility: HOSPITAL | Age: 50
End: 2025-06-23
Payer: COMMERCIAL

## 2025-06-24 ENCOUNTER — TELEPHONE (OUTPATIENT)
Dept: PRIMARY CARE | Facility: CLINIC | Age: 50
End: 2025-06-24
Payer: COMMERCIAL

## 2025-07-07 ENCOUNTER — APPOINTMENT (OUTPATIENT)
Dept: RADIOLOGY | Facility: HOSPITAL | Age: 50
End: 2025-07-07
Payer: COMMERCIAL

## 2025-07-07 DIAGNOSIS — Z12.31 ENCOUNTER FOR SCREENING MAMMOGRAM FOR MALIGNANT NEOPLASM OF BREAST: ICD-10-CM

## 2025-07-07 PROCEDURE — 77067 SCR MAMMO BI INCL CAD: CPT | Performed by: RADIOLOGY

## 2025-07-07 PROCEDURE — 77067 SCR MAMMO BI INCL CAD: CPT

## 2025-07-07 PROCEDURE — 77063 BREAST TOMOSYNTHESIS BI: CPT | Performed by: RADIOLOGY

## 2025-07-14 DIAGNOSIS — R42 DIZZINESS AND GIDDINESS: ICD-10-CM

## 2025-07-15 RX ORDER — AZELASTINE 1 MG/ML
2 SPRAY, METERED NASAL 2 TIMES DAILY
Qty: 90 ML | Refills: 3 | Status: SHIPPED | OUTPATIENT
Start: 2025-07-15

## 2025-07-22 ENCOUNTER — APPOINTMENT (OUTPATIENT)
Dept: CARDIOLOGY | Facility: CLINIC | Age: 50
End: 2025-07-22
Payer: COMMERCIAL

## 2025-07-22 VITALS
SYSTOLIC BLOOD PRESSURE: 125 MMHG | HEIGHT: 61 IN | HEART RATE: 80 BPM | BODY MASS INDEX: 40.97 KG/M2 | OXYGEN SATURATION: 95 % | DIASTOLIC BLOOD PRESSURE: 58 MMHG | WEIGHT: 217 LBS

## 2025-07-22 DIAGNOSIS — E78.2 MIXED HYPERLIPIDEMIA: ICD-10-CM

## 2025-07-22 DIAGNOSIS — I10 PRIMARY HYPERTENSION: ICD-10-CM

## 2025-07-22 DIAGNOSIS — I42.2 HYPERTROPHIC CARDIOMYOPATHY (MULTI): ICD-10-CM

## 2025-07-22 DIAGNOSIS — R07.2 PRECORDIAL PAIN: Primary | ICD-10-CM

## 2025-07-22 DIAGNOSIS — I20.9 ANGINA PECTORIS: ICD-10-CM

## 2025-07-22 PROCEDURE — 3008F BODY MASS INDEX DOCD: CPT | Performed by: INTERNAL MEDICINE

## 2025-07-22 PROCEDURE — 3074F SYST BP LT 130 MM HG: CPT | Performed by: INTERNAL MEDICINE

## 2025-07-22 PROCEDURE — 99214 OFFICE O/P EST MOD 30 MIN: CPT | Performed by: INTERNAL MEDICINE

## 2025-07-22 PROCEDURE — 3078F DIAST BP <80 MM HG: CPT | Performed by: INTERNAL MEDICINE

## 2025-07-22 RX ORDER — METOPROLOL SUCCINATE 50 MG/1
50 TABLET, EXTENDED RELEASE ORAL DAILY
Qty: 90 TABLET | Refills: 3 | Status: SHIPPED | OUTPATIENT
Start: 2025-07-22 | End: 2026-07-22

## 2025-07-22 ASSESSMENT — PATIENT HEALTH QUESTIONNAIRE - PHQ9
SUM OF ALL RESPONSES TO PHQ9 QUESTIONS 1 AND 2: 0
2. FEELING DOWN, DEPRESSED OR HOPELESS: NOT AT ALL
1. LITTLE INTEREST OR PLEASURE IN DOING THINGS: NOT AT ALL

## 2025-07-22 ASSESSMENT — LIFESTYLE VARIABLES
HOW OFTEN DO YOU HAVE A DRINK CONTAINING ALCOHOL: NEVER
HOW MANY STANDARD DRINKS CONTAINING ALCOHOL DO YOU HAVE ON A TYPICAL DAY: PATIENT DOES NOT DRINK
SKIP TO QUESTIONS 9-10: 1
AUDIT-C TOTAL SCORE: 0
HOW OFTEN DO YOU HAVE SIX OR MORE DRINKS ON ONE OCCASION: NEVER

## 2025-07-22 ASSESSMENT — PAIN SCALES - GENERAL: PAINLEVEL_OUTOF10: 0-NO PAIN

## 2025-07-22 ASSESSMENT — ENCOUNTER SYMPTOMS
DEPRESSION: 0
LOSS OF SENSATION IN FEET: 0
OCCASIONAL FEELINGS OF UNSTEADINESS: 1

## 2025-07-22 NOTE — PATIENT INSTRUCTIONS
We will increase your metoprolol to 50 mg 1 tablet daily.  You may use what you half for now but take 2 pills at the same time until you get the stronger one.    Call centralized scheduling to schedule coronary CT angio this will be done with IV contrast to look at blockages in the arteries of the heart.    Will also send to scheduling to have cardiac MRI at [facility]    I will see you in 3

## 2025-07-22 NOTE — PROGRESS NOTES
[facility] [place]    Interventional Cardiology    History of present illness:  49 year old female patient here today following up on hx of atypical chest pains and abnormal EKG. her initial EKG was showing normal sinus rhythm nonspecific ST-T  abnormality suggestive of LVH.. 2D Echo on 04/19/19 was normal with EF of 60-65% and Nuclear stress test in 09/2019 was normal with EF of 53%.  Patient underwent CT calcium score on September 2021 that showed 0 calcium in her arteries. Lung windows were okay.  Patient had 2D echo in February 2024 as a workup for abnormal EKG and vertigo that showed normal ejection fraction no major valve abnormalities.  Patient returns to my office for follow-up.  Still complaining of chest pain.  Has been having more frequent episodes of chest pain as well as shortness of breath with exertion.  She also reports to me strong family history of coronary artery disease.  Patient has metabolic syndrome.  Denies having orthopnea or PND.  No lower extremity edema.  EKG again showing significant ST-T wave abnormality suggestive of LVH although she has been treated for hypertension for several years.  No LVH was seen on her previous echoes.      Medical History[1]    Surgical History[2]    Allergies[3]     reports that she has never smoked. She has never been exposed to tobacco smoke. She has never used smokeless tobacco. She reports that she does not drink alcohol and does not use drugs.    Family History[4]    Patient's Medications   New Prescriptions    No medications on file   Previous Medications    ASPIRIN 81 MG EC TABLET    Take 1 tablet (81 mg) by mouth once daily.    ATORVASTATIN (LIPITOR) 20 MG TABLET    Take 1 tablet (20 mg) by mouth once daily.    AZELASTINE (ASTELIN) 137 MCG (0.1 %) NASAL SPRAY    ADMINISTER 2 SPRAYS INTO EACH NOSTRIL 2 TIMES A DAY.    FENOFIBRATE (TRICOR) 145 MG TABLET    Take 1 tablet (145 mg) by mouth once daily.     LEVOTHYROXINE (SYNTHROID, LEVOXYL) 100 MCG TABLET    TAKE 1 TABLET BY MOUTH EVERY DAY IN THE MORNING ON EMPTY STOMACH FOR 90 DAYS    LORATADINE (CLARITIN) 10 MG TABLET    Take 1 tablet (10 mg) by mouth once daily.    MECLIZINE (ANTIVERT) 25 MG TABLET    Take 1 tablet (25 mg) by mouth 3 times a day as needed for dizziness.    MELOXICAM (MOBIC) 15 MG TABLET    TAKE 1 TABLET BY MOUTH EVERY DAY    METFORMIN  MG 24 HR TABLET    TAKE 1 TABLET (500 MG) BY MOUTH ONCE DAILY IN THE EVENING. TAKE WITH MEALS FOR 8 DAYS. DO NOT CRUSH, CHEW, OR SPLIT.    METOPROLOL SUCCINATE XL (TOPROL-XL) 25 MG 24 HR TABLET    Take 1 tablet (25 mg) by mouth once daily. Do not crush or chew.    MOMETASONE (ELOCON) 0.1 % CREAM    1 Application.    OMEPRAZOLE (PRILOSEC) 20 MG DR CAPSULE    TAKE 1 CAPSULE BY MOUTH EVERY DAY    SERTRALINE (ZOLOFT) 100 MG TABLET    TAKE 1 TABLET BY MOUTH EVERY DAY    TIRZEPATIDE (MOUNJARO) 5 MG/0.5 ML PEN INJECTOR    Inject 5 mg under the skin 1 (one) time per week.   Modified Medications    No medications on file   Discontinued Medications    No medications on file       Objective   Physical Exam  General: Patient in no acute distress   HEENT: Atraumatic normocephalic.  Neck: Supple, jugular venous pressure within normal limit.  No bruits  Lungs: Clear to auscultation bilaterally  Cardiovascular: Regular rate and rhythm, normal heart sounds, no murmurs rubs or gallops  Abdomen: Soft nontender nondistended.  Normal bowel sounds.  Extremities: Warm to touch, no edema.    Lab Review   Orders Only on 06/06/2025   Component Date Value    GLUCOSE 06/13/2025 116 (H)     UREA NITROGEN (BUN) 06/13/2025 22     CREATININE 06/13/2025 0.92     EGFR 06/13/2025 75     SODIUM 06/13/2025 139     POTASSIUM 06/13/2025 4.6     CHLORIDE 06/13/2025 102     CARBON DIOXIDE 06/13/2025 28     ELECTROLYTE BALANCE 06/13/2025 9     CALCIUM 06/13/2025 9.6     PROTEIN, TOTAL 06/13/2025 7.1     ALBUMIN 06/13/2025 4.4     BILIRUBIN, TOTAL  06/13/2025 0.4     ALKALINE PHOSPHATASE 06/13/2025 60     AST 06/13/2025 17     ALT 06/13/2025 21     HEMOGLOBIN A1c 06/13/2025 6.9 (H)     eAG (mg/dL) 06/13/2025 151     eAG (mmol/L) 06/13/2025 8.4     CHOLESTEROL, TOTAL 06/13/2025 129     HDL CHOLESTEROL 06/13/2025 37 (L)     TRIGLYCERIDES 06/13/2025 189 (H)     LDL-CHOLESTEROL 06/13/2025 66     CHOL/HDLC RATIO 06/13/2025 3.5     NON HDL CHOLESTEROL 06/13/2025 92     VITAMIN B12 06/13/2025 458     TSH W/REFLEX TO FT4 06/13/2025 1.81     VITAMIN D,25-OH,TOTAL,IA 06/13/2025 33     WHITE BLOOD CELL COUNT 06/13/2025 8.2     RED BLOOD CELL COUNT 06/13/2025 4.98     HEMOGLOBIN 06/13/2025 12.8     HEMATOCRIT 06/13/2025 40.9     MCV 06/13/2025 82.1     MCH 06/13/2025 25.7 (L)     MCHC 06/13/2025 31.3 (L)     RDW 06/13/2025 13.8     PLATELET COUNT 06/13/2025 326     MPV 06/13/2025 9.6         Assessment/Plan   Problem List[5]     49 year old female patient here today following up on hx of atypical chest pains and abnormal EKG. her initial EKG was showing normal sinus rhythm nonspecific ST-T  abnormality suggestive of LVH.. 2D Echo on 04/19/19 was normal with EF of 60-65% and Nuclear stress test in 09/2019 was normal with EF of 53%.  Patient underwent CT calcium score on September 2021 that showed 0 calcium in her arteries. Lung windows were okay.  Patient had 2D echo in February 2024 as a workup for abnormal EKG and vertigo that showed normal ejection fraction no major valve abnormalities.  Patient returns to my office for follow-up.  Still complaining of chest pain.  Has been having more frequent episodes of chest pain as well as shortness of breath with exertion.  She also reports to me strong family history of coronary artery disease.  Patient has metabolic syndrome.  Denies having orthopnea or PND.  No lower extremity edema.  EKG again showing significant ST-T wave abnormality suggestive of LVH although she has been treated for hypertension for several years.  No LVH  was seen on her previous echoes.      I am still concerns about her electrocardiographic findings of significant ST-T wave abnormalities which may suggest LVH but cannot rule out ischemia.  I will arrange for cardiac MR with gadolinium enhancement to rule out hypertrophic cardiomyopathy or any significant scar in the LV.  Will also arrange for a coronary CT angiogram to rule out any significant change in her coronary artery disease since she have not had ischemic workup for couple years.  She remains symptomatic on optimal medical therapy.  LDL at target.  Blood pressure very well-controlled.  Will follow-up in 4 months.  Discussed with her weight loss and lifestyle modification     Sebastian Cintron MD              [1]   Past Medical History:  Diagnosis Date    Angina pectoris 02/22/2024    Disease of thyroid gland     Disorder of thyroid, unspecified     Thyroid trouble    Dizziness [id number]    GERD (gastroesophageal reflux disease)     Mixed hyperlipidemia 02/22/2024    Other conditions influencing health status     Right handed    Personal history of other diseases of the circulatory system     History of hypertension    Personal history of other specified conditions     History of shortness of breath    Primary hypertension 02/22/2024    Unspecified disorder of nose and nasal sinuses     Sinus trouble   [2]   Past Surgical History:  Procedure Laterality Date    TONSILLECTOMY     [3]   Allergies  Allergen Reactions    Penicillins Swelling and Unknown     Facial swelling    Sulfa (Sulfonamide Antibiotics) Unknown   [4]   Family History  Problem Relation Name Age of Onset    Breast cancer Mother  35    Lung cancer Father Jabier     COPD Father Jabier     Emphysema Father Jabier     Heart failure Father Jabier     Prostate cancer Father Jabier     Diabetes Sister Carmelita     Drug abuse Brother      Cancer Mother Anjelica Aquino     Cancer Father Jabier    [5]   Patient Active Problem List  Diagnosis    Angina pectoris    Anxiety    Carpal  tunnel syndrome of left wrist    Cystitis    Eczema    Gastroesophageal reflux disease    Hand pain    Hypothyroidism    Kidney stone    Mixed hyperlipidemia    Obstructive sleep apnea syndrome    Primary hypertension    Seasonal allergic rhinitis    Venous thrombosis    Prediabetes    Vertigo    Right hip pain    Acute coccygeal pain

## 2025-08-25 ENCOUNTER — APPOINTMENT (OUTPATIENT)
Dept: PRIMARY CARE | Facility: CLINIC | Age: 50
End: 2025-08-25
Payer: COMMERCIAL

## 2025-08-28 ENCOUNTER — TELEPHONE (OUTPATIENT)
Dept: CARDIOLOGY | Facility: CLINIC | Age: 50
End: 2025-08-28
Payer: COMMERCIAL

## 2025-08-28 DIAGNOSIS — I10 PRIMARY HYPERTENSION: Primary | ICD-10-CM

## 2025-08-28 RX ORDER — METOPROLOL TARTRATE 50 MG/1
50 TABLET ORAL ONCE
Qty: 2 TABLET | Refills: 0 | Status: SHIPPED | OUTPATIENT
Start: 2025-08-28 | End: 2025-08-28

## 2025-08-29 LAB
ANION GAP SERPL CALCULATED.4IONS-SCNC: 11 MMOL/L (CALC) (ref 7–17)
BUN SERPL-MCNC: 21 MG/DL (ref 7–25)
BUN/CREAT SERPL: NORMAL (CALC) (ref 6–22)
CALCIUM SERPL-MCNC: 9.6 MG/DL (ref 8.6–10.4)
CHLORIDE SERPL-SCNC: 104 MMOL/L (ref 98–110)
CO2 SERPL-SCNC: 26 MMOL/L (ref 20–32)
CREAT SERPL-MCNC: 0.95 MG/DL (ref 0.5–1.03)
EGFRCR SERPLBLD CKD-EPI 2021: 72 ML/MIN/1.73M2
GLUCOSE SERPL-MCNC: 93 MG/DL (ref 65–99)
POTASSIUM SERPL-SCNC: 4.8 MMOL/L (ref 3.5–5.3)
SODIUM SERPL-SCNC: 141 MMOL/L (ref 135–146)

## 2025-09-05 ENCOUNTER — HOSPITAL ENCOUNTER (OUTPATIENT)
Dept: RADIOLOGY | Facility: HOSPITAL | Age: 50
Discharge: HOME | End: 2025-09-05
Payer: COMMERCIAL

## 2025-09-05 VITALS — DIASTOLIC BLOOD PRESSURE: 50 MMHG | HEART RATE: 73 BPM | SYSTOLIC BLOOD PRESSURE: 110 MMHG

## 2025-09-05 VITALS — WEIGHT: 216.05 LBS | HEIGHT: 61 IN | BODY MASS INDEX: 40.79 KG/M2

## 2025-09-05 DIAGNOSIS — R07.2 PRECORDIAL PAIN: ICD-10-CM

## 2025-09-05 DIAGNOSIS — I42.2 HYPERTROPHIC CARDIOMYOPATHY (MULTI): ICD-10-CM

## 2025-09-05 PROCEDURE — 2550000001 HC RX 255 CONTRASTS: Performed by: INTERNAL MEDICINE

## 2025-09-05 PROCEDURE — A9575 INJ GADOTERATE MEGLUMI 0.1ML: HCPCS | Performed by: INTERNAL MEDICINE

## 2025-09-05 PROCEDURE — 2500000001 HC RX 250 WO HCPCS SELF ADMINISTERED DRUGS (ALT 637 FOR MEDICARE OP): Performed by: RADIOLOGY

## 2025-09-05 PROCEDURE — 2500000004 HC RX 250 GENERAL PHARMACY W/ HCPCS (ALT 636 FOR OP/ED): Performed by: RADIOLOGY

## 2025-09-05 PROCEDURE — 75561 CARDIAC MRI FOR MORPH W/DYE: CPT

## 2025-09-05 PROCEDURE — 75574 CT ANGIO HRT W/3D IMAGE: CPT

## 2025-09-05 RX ORDER — METOPROLOL TARTRATE 100 MG/1
100 TABLET ORAL ONCE
Status: DISCONTINUED | OUTPATIENT
Start: 2025-09-05 | End: 2025-09-06 | Stop reason: HOSPADM

## 2025-09-05 RX ORDER — METOPROLOL TARTRATE 1 MG/ML
5 INJECTION, SOLUTION INTRAVENOUS ONCE
Status: COMPLETED | OUTPATIENT
Start: 2025-09-05 | End: 2025-09-05

## 2025-09-05 RX ORDER — NITROGLYCERIN 0.4 MG/1
0.8 TABLET SUBLINGUAL ONCE
Status: COMPLETED | OUTPATIENT
Start: 2025-09-05 | End: 2025-09-05

## 2025-09-05 RX ORDER — GADOTERATE MEGLUMINE 376.9 MG/ML
39 INJECTION INTRAVENOUS
Status: COMPLETED | OUTPATIENT
Start: 2025-09-05 | End: 2025-09-05

## 2025-09-05 RX ORDER — LORAZEPAM 0.5 MG/1
0.5 TABLET ORAL ONCE
Status: DISCONTINUED | OUTPATIENT
Start: 2025-09-05 | End: 2025-09-06 | Stop reason: HOSPADM

## 2025-09-05 RX ORDER — METOPROLOL TARTRATE 1 MG/ML
5 INJECTION, SOLUTION INTRAVENOUS ONCE AS NEEDED
Status: DISCONTINUED | OUTPATIENT
Start: 2025-09-05 | End: 2025-09-06 | Stop reason: HOSPADM

## 2025-09-05 RX ORDER — METOPROLOL TARTRATE 100 MG/1
100 TABLET ORAL ONCE AS NEEDED
Status: DISCONTINUED | OUTPATIENT
Start: 2025-09-05 | End: 2025-09-06 | Stop reason: HOSPADM

## 2025-09-05 RX ORDER — METOPROLOL TARTRATE 1 MG/ML
5 INJECTION, SOLUTION INTRAVENOUS ONCE AS NEEDED
Status: COMPLETED | OUTPATIENT
Start: 2025-09-05 | End: 2025-09-05

## 2025-09-05 RX ADMIN — IOHEXOL 90 ML: 350 INJECTION, SOLUTION INTRAVENOUS at 09:13

## 2025-09-05 RX ADMIN — NITROGLYCERIN 0.8 MG: 0.4 TABLET SUBLINGUAL at 09:04

## 2025-09-05 RX ADMIN — METOPROLOL TARTRATE 5 MG: 5 INJECTION, SOLUTION INTRAVENOUS at 09:02

## 2025-09-05 RX ADMIN — GADOTERATE MEGLUMINE 39 ML: 376.9 INJECTION INTRAVENOUS at 08:44

## 2025-09-05 RX ADMIN — METOPROLOL TARTRATE 5 MG: 5 INJECTION, SOLUTION INTRAVENOUS at 08:57

## 2025-09-05 ASSESSMENT — PAIN - FUNCTIONAL ASSESSMENT: PAIN_FUNCTIONAL_ASSESSMENT: 0-10

## 2025-09-05 ASSESSMENT — PAIN SCALES - GENERAL: PAINLEVEL_OUTOF10: 0 - NO PAIN

## 2025-09-23 ENCOUNTER — APPOINTMENT (OUTPATIENT)
Dept: CARDIOLOGY | Facility: CLINIC | Age: 50
End: 2025-09-23
Payer: COMMERCIAL

## 2025-10-21 ENCOUNTER — APPOINTMENT (OUTPATIENT)
Dept: OTOLARYNGOLOGY | Facility: CLINIC | Age: 50
End: 2025-10-21
Payer: COMMERCIAL

## 2026-01-05 ENCOUNTER — APPOINTMENT (OUTPATIENT)
Dept: PRIMARY CARE | Facility: CLINIC | Age: 51
End: 2026-01-05
Payer: COMMERCIAL